# Patient Record
Sex: MALE | Race: WHITE | NOT HISPANIC OR LATINO | Employment: FULL TIME | URBAN - NONMETROPOLITAN AREA
[De-identification: names, ages, dates, MRNs, and addresses within clinical notes are randomized per-mention and may not be internally consistent; named-entity substitution may affect disease eponyms.]

---

## 2022-09-20 ENCOUNTER — OFFICE VISIT (OUTPATIENT)
Dept: URGENT CARE | Facility: CLINIC | Age: 42
End: 2022-09-20
Payer: COMMERCIAL

## 2022-09-20 ENCOUNTER — APPOINTMENT (OUTPATIENT)
Dept: RADIOLOGY | Facility: CLINIC | Age: 42
End: 2022-09-20
Payer: COMMERCIAL

## 2022-09-20 VITALS
HEART RATE: 89 BPM | DIASTOLIC BLOOD PRESSURE: 82 MMHG | OXYGEN SATURATION: 97 % | RESPIRATION RATE: 20 BRPM | TEMPERATURE: 97.2 F | WEIGHT: 169.6 LBS | SYSTOLIC BLOOD PRESSURE: 133 MMHG

## 2022-09-20 DIAGNOSIS — R05.9 COUGH: Primary | ICD-10-CM

## 2022-09-20 DIAGNOSIS — J06.9 VIRAL UPPER RESPIRATORY TRACT INFECTION: ICD-10-CM

## 2022-09-20 DIAGNOSIS — R05.9 COUGH: ICD-10-CM

## 2022-09-20 PROBLEM — J45.909: Status: ACTIVE | Noted: 2018-09-25

## 2022-09-20 PROCEDURE — 99203 OFFICE O/P NEW LOW 30 MIN: CPT | Performed by: PHYSICIAN ASSISTANT

## 2022-09-20 PROCEDURE — 71046 X-RAY EXAM CHEST 2 VIEWS: CPT

## 2022-09-20 RX ORDER — ALBUTEROL SULFATE 90 UG/1
2 AEROSOL, METERED RESPIRATORY (INHALATION) EVERY 6 HOURS PRN
COMMUNITY

## 2022-09-20 RX ORDER — PREDNISONE 10 MG/1
TABLET ORAL
Qty: 20 TABLET | Refills: 0 | Status: SHIPPED | OUTPATIENT
Start: 2022-09-20

## 2022-09-20 RX ORDER — MULTIVITAMIN
1 TABLET ORAL DAILY
COMMUNITY

## 2022-09-20 NOTE — PROGRESS NOTES
[facility]        NAME: Wilberto Thorpe is a 38 y o  male  : 1980    MRN: [id number]  DATE: 2022  TIME: 10:01 AM    Assessment and Plan   Cough [R05 9]  1  Cough  XR chest pa & lateral   2  Viral upper respiratory tract infection  predniSONE 10 mg tablet         Patient Instructions   Patient Instructions     Upper Respiratory Infection   WHAT YOU NEED TO KNOW:   An upper respiratory infection is also called a cold  It can affect your nose, throat, ears, and sinuses  Cold symptoms are usually worst for the first 3 to 5 days  Most people get better in 7 to 14 days  You may continue to cough for 2 to 3 weeks  Colds are caused by viruses and do not get better with antibiotics  DISCHARGE INSTRUCTIONS:   Call your local emergency number (911 in the [address] [place] [address],[address]) if:   · You have chest pain or trouble breathing  Return to the emergency department if:   · You have a fever over 102ºF (39ºC)  Call your doctor if:   · You have a low fever  · Your sore throat gets worse or you see white or yellow spots in your throat  · Your symptoms get worse after 3 to 5 days or are not better in 14 days  · You have a rash anywhere on your skin  · You have large, tender lumps in your neck  · You have thick, green, or yellow drainage from your nose  · You cough up thick yellow, green, or bloody mucus  · You have a bad earache  · You have questions or concerns about your condition or care  Medicines: You may need any of the following:  · Decongestants  help reduce nasal congestion and help you breathe more easily  If you take decongestant pills, they may make you feel restless or cause problems with your sleep  Do not use decongestant sprays for more than a few days  · Cough suppressants  help reduce coughing  Ask your healthcare provider which type of cough medicine is best for you  · NSAIDs , such as ibuprofen, help decrease swelling, pain, and fever   NSAIDs can cause stomach bleeding or kidney problems in certain people  If you take blood thinner medicine, always ask your healthcare provider if NSAIDs are safe for you  Always read the medicine label and follow directions  · Acetaminophen  decreases pain and fever  It is available without a doctor's order  Ask how much to take and how often to take it  Follow directions  Read the labels of all other medicines you are using to see if they also contain acetaminophen, or ask your doctor or pharmacist  Acetaminophen can cause liver damage if not taken correctly  Do not use more than 4 grams (4,000 milligrams) total of acetaminophen in one day  · Take your medicine as directed  Contact your healthcare provider if you think your medicine is not helping or if you have side effects  Tell him or her if you are allergic to any medicine  Keep a list of the medicines, vitamins, and herbs you take  Include the amounts, and when and why you take them  Bring the list or the pill bottles to follow-up visits  Carry your medicine list with you in case of an emergency  Self-care:   · Rest as much as possible  Slowly start to do more each day  · Drink more liquids as directed  Liquids will help thin and loosen mucus so you can cough it up  Liquids will also help prevent dehydration  Liquids that help prevent dehydration include water, fruit juice, and broth  Do not drink liquids that contain caffeine  Caffeine can increase your risk for dehydration  Ask your healthcare provider how much liquid to drink each day  · Soothe a sore throat  Gargle with warm salt water  Make salt water by dissolving ¼ teaspoon salt in 1 cup warm water  You may also suck on hard candy or throat lozenges  You may use a sore throat spray  · Use a humidifier or vaporizer  Use a cool mist humidifier or a vaporizer to increase air moisture in your home  This may make it easier for you to breathe and help decrease your cough      · Use saline nasal drops as directed  These help relieve congestion  · Apply petroleum-based jelly around the outside of your nostrils  This can decrease irritation from blowing your nose  · Do not smoke  Nicotine and other chemicals in cigarettes and cigars can make your symptoms worse  They can also cause infections such as bronchitis or pneumonia  Ask your healthcare provider for information if you currently smoke and need help to quit  E-cigarettes or smokeless tobacco still contain nicotine  Talk to your healthcare provider before you use these products  Prevent a cold:   · Wash your hands often  Use soap and water every time you wash your hands  Rub your soapy hands together, lacing your fingers  Use the fingers of one hand to scrub under the nails of the other hand  Wash for at least 20 seconds  Rinse with warm, running water for several seconds  Then dry your hands  Use germ-killing gel if soap and water are not available  Do not touch your eyes or mouth without washing your hands first          · Cover a sneeze or cough  Use a tissue that covers your mouth and nose  Put the used tissue in the trash right away  Use the bend of your arm if a tissue is not available  Wash your hands well with soap and water or use a hand   Do not stand close to anyone who is sneezing or coughing  · Try to stay away from others while you are sick  This is especially important during the first 2 to 3 days when the virus is more easily spread  Wait until a fever, cough, or other symptoms are gone before you return to work or other regular activities  · Do not share items while you are sick  This includes food, drinks, eating utensils, and dishes  Follow up with your doctor as directed:  Write down your questions so you remember to ask them during your visits  © Copyright [employer] 2022 Information is for End User's use only and may not be sold, redistributed or otherwise used for commercial purposes   All illustrations and images included in CareNotes® are the copyrighted property of [employer] M , Inc  or [facility]  The above information is an  only  It is not intended as medical advice for individual conditions or treatments  Talk to your doctor, nurse or pharmacist before following any medical regimen to see if it is safe and effective for you  Follow up with PCP in 3-5 days  Proceed to  ER if symptoms worsen  Chief Complaint     Chief Complaint   Patient presents with    Cold Like Symptoms     Cough, sore throat and congestion since Thursday  History of Present Illness       The patient presents to the clinic for a cough, sore throat, congestion, x4 days  The patient was diagnosed with COVID echo ice anger on July 12th  He was not treated for his symptoms  He does have a history of asthma for which he takes albuterol  The patient states that he does not feel that his cough has resolved since he was diagnosed with COVID  He continues to have a nonproductive cough as well as some slight shortness of breath  He currently is only on albuterol for his asthma but states that he has not needed the albuterol more often than usual   He also had a sore throat earlier in the week which has since resolved  He currently denies associated fevers, chills, nausea or vomiting  The patient had a COVID booster last year but is not currently up-to-date on his COVID vaccines  Review of Systems   Review of Systems   Constitutional: Negative for chills and fever  HENT: Positive for rhinorrhea and sore throat  Negative for congestion, ear pain, postnasal drip, sinus pressure and sinus pain  Eyes: Negative for pain and visual disturbance  Respiratory: Positive for cough, shortness of breath and wheezing  Cardiovascular: Negative for chest pain and palpitations  Gastrointestinal: Negative for abdominal pain, nausea and vomiting     Genitourinary: Negative for dysuria and hematuria  Musculoskeletal: Negative for arthralgias and back pain  Skin: Negative for color change and rash  Neurological: Positive for headaches  Negative for dizziness, seizures and syncope  All other systems reviewed and are negative  Current Medications       Current Outpatient Medications:     albuterol (PROVENTIL HFA,VENTOLIN HFA) 90 mcg/act inhaler, Inhale 2 puffs every 6 (six) hours as needed for wheezing, Disp: , Rfl:     Multiple Vitamin (multivitamin) tablet, Take 1 tablet by mouth daily, Disp: , Rfl:     predniSONE 10 mg tablet, 4 po for 2 days, then 3x2, 2x2, 1x2, Disp: 20 tablet, Rfl: 0    Current Allergies     Allergies as of 09/20/2022 - Reviewed 09/20/2022   Allergen Reaction Noted    Sulfa antibiotics Hives 09/20/2022            The following portions of the patient's history were reviewed and updated as appropriate: allergies, current medications, past family history, past medical history, past social history, past surgical history and problem list      Past Medical History:   Diagnosis Date    Asthma        History reviewed  No pertinent surgical history  History reviewed  No pertinent family history  Medications have been verified  Objective   /82   Pulse 89   Temp (!) 97 2 °F (36 2 °C)   Resp 20   Wt 76 9 kg (169 lb 9 6 oz)   SpO2 97%        Physical Exam     Physical Exam  Constitutional:       Appearance: He is well-developed  HENT:      Head: Normocephalic  Nose: Congestion and rhinorrhea present  Eyes:      General:         Left eye: No discharge  Pupils: Pupils are equal, round, and reactive to light  Neck:      Thyroid: No thyromegaly  Trachea: No tracheal deviation  Cardiovascular:      Rate and Rhythm: Normal rate and regular rhythm  Heart sounds: No murmur heard  Pulmonary:      Effort: Pulmonary effort is normal  No respiratory distress  Breath sounds: Rhonchi present  No wheezing or rales        Comments: -rhonchi noted left lower lung base  Chest:      Chest wall: No tenderness  Abdominal:      General: Bowel sounds are normal  There is no distension  Palpations: Abdomen is soft  There is no mass  Tenderness: There is no abdominal tenderness  There is no guarding or rebound  Musculoskeletal:         General: Normal range of motion  Cervical back: Normal range of motion  Skin:     General: Skin is warm  Neurological:      Mental Status: He is alert  EXAM PERFORMED/VIEWS:  XR CHEST PA & LATERAL  DUAL ENERGY SUBTRACTION        FINDINGS:     Cardiomediastinal silhouette appears unremarkable      The lungs are clear  No pneumothorax or pleural effusion      Osseous structures appear within normal limits for patient age      IMPRESSION:     No acute cardiopulmonary disease            -I reviewed the chest x-ray with the patient  His symptoms are likely viral given his negative chest x-ray and recent COVID infection  I will add prednisone to help with his breathing  The patient was instructed to continue to use albuterol as needed and he will need to follow up with his PCP if his symptoms fail to improve  Patient was instructed to go the ER if symptoms worsen

## 2022-09-20 NOTE — LETTER
September 20, 2022     Patient: Shante Aguilar   YOB: 1980   Date of Visit: 9/20/2022       To Whom it May Concern:    Shante Aguilar was seen in my clinic on 9/20/2022  He may return to work on 09/21/2022  He was off work 09/19-9/20    If you have any questions or concerns, please don't hesitate to call           Sincerely,          Josemanuel Goel PA-C        CC: No Recipients

## 2022-09-20 NOTE — PATIENT INSTRUCTIONS
Upper Respiratory Infection   WHAT YOU NEED TO KNOW:   An upper respiratory infection is also called a cold  It can affect your nose, throat, ears, and sinuses  Cold symptoms are usually worst for the first 3 to 5 days  Most people get better in 7 to 14 days  You may continue to cough for 2 to 3 weeks  Colds are caused by viruses and do not get better with antibiotics  DISCHARGE INSTRUCTIONS:   Call your local emergency number (911 in the [address] [place] [address],[address]) if:   You have chest pain or trouble breathing  Return to the emergency department if:   You have a fever over 102ºF (39ºC)  Call your doctor if:   You have a low fever  Your sore throat gets worse or you see white or yellow spots in your throat  Your symptoms get worse after 3 to 5 days or are not better in 14 days  You have a rash anywhere on your skin  You have large, tender lumps in your neck  You have thick, green, or yellow drainage from your nose  You cough up thick yellow, green, or bloody mucus  You have a bad earache  You have questions or concerns about your condition or care  Medicines: You may need any of the following:  Decongestants  help reduce nasal congestion and help you breathe more easily  If you take decongestant pills, they may make you feel restless or cause problems with your sleep  Do not use decongestant sprays for more than a few days  Cough suppressants  help reduce coughing  Ask your healthcare provider which type of cough medicine is best for you  NSAIDs , such as ibuprofen, help decrease swelling, pain, and fever  NSAIDs can cause stomach bleeding or kidney problems in certain people  If you take blood thinner medicine, always ask your healthcare provider if NSAIDs are safe for you  Always read the medicine label and follow directions  Acetaminophen  decreases pain and fever  It is available without a doctor's order  Ask how much to take and how often to take it  Follow directions   Read the labels of all other medicines you are using to see if they also contain acetaminophen, or ask your doctor or pharmacist  Acetaminophen can cause liver damage if not taken correctly  Do not use more than 4 grams (4,000 milligrams) total of acetaminophen in one day  Take your medicine as directed  Contact your healthcare provider if you think your medicine is not helping or if you have side effects  Tell him or her if you are allergic to any medicine  Keep a list of the medicines, vitamins, and herbs you take  Include the amounts, and when and why you take them  Bring the list or the pill bottles to follow-up visits  Carry your medicine list with you in case of an emergency  Self-care:   Rest as much as possible  Slowly start to do more each day  Drink more liquids as directed  Liquids will help thin and loosen mucus so you can cough it up  Liquids will also help prevent dehydration  Liquids that help prevent dehydration include water, fruit juice, and broth  Do not drink liquids that contain caffeine  Caffeine can increase your risk for dehydration  Ask your healthcare provider how much liquid to drink each day  Soothe a sore throat  Gargle with warm salt water  Make salt water by dissolving ¼ teaspoon salt in 1 cup warm water  You may also suck on hard candy or throat lozenges  You may use a sore throat spray  Use a humidifier or vaporizer  Use a cool mist humidifier or a vaporizer to increase air moisture in your home  This may make it easier for you to breathe and help decrease your cough  Use saline nasal drops as directed  These help relieve congestion  Apply petroleum-based jelly around the outside of your nostrils  This can decrease irritation from blowing your nose  Do not smoke  Nicotine and other chemicals in cigarettes and cigars can make your symptoms worse  They can also cause infections such as bronchitis or pneumonia   Ask your healthcare provider for information if you currently smoke and need help to quit  E-cigarettes or smokeless tobacco still contain nicotine  Talk to your healthcare provider before you use these products  Prevent a cold: Wash your hands often  Use soap and water every time you wash your hands  Rub your soapy hands together, lacing your fingers  Use the fingers of one hand to scrub under the nails of the other hand  Wash for at least 20 seconds  Rinse with warm, running water for several seconds  Then dry your hands  Use germ-killing gel if soap and water are not available  Do not touch your eyes or mouth without washing your hands first          Cover a sneeze or cough  Use a tissue that covers your mouth and nose  Put the used tissue in the trash right away  Use the bend of your arm if a tissue is not available  Wash your hands well with soap and water or use a hand   Do not stand close to anyone who is sneezing or coughing  Try to stay away from others while you are sick  This is especially important during the first 2 to 3 days when the virus is more easily spread  Wait until a fever, cough, or other symptoms are gone before you return to work or other regular activities  Do not share items while you are sick  This includes food, drinks, eating utensils, and dishes  Follow up with your doctor as directed:  Write down your questions so you remember to ask them during your visits  © Copyright [employer] 2022 Information is for End User's use only and may not be sold, redistributed or otherwise used for commercial purposes  All illustrations and images included in CareNotes® are the copyrighted property of [employer]  or [facility] [employer]   The above information is an  only  It is not intended as medical advice for individual conditions or treatments  Talk to your doctor, nurse or pharmacist before following any medical regimen to see if it is safe and effective for you

## 2022-11-08 ENCOUNTER — OFFICE VISIT (OUTPATIENT)
Dept: URGENT CARE | Facility: CLINIC | Age: 42
End: 2022-11-08

## 2022-11-08 VITALS
OXYGEN SATURATION: 98 % | DIASTOLIC BLOOD PRESSURE: 84 MMHG | SYSTOLIC BLOOD PRESSURE: 162 MMHG | WEIGHT: 176.2 LBS | TEMPERATURE: 97.8 F | RESPIRATION RATE: 20 BRPM | HEART RATE: 74 BPM

## 2022-11-08 DIAGNOSIS — R05.1 ACUTE COUGH: ICD-10-CM

## 2022-11-08 DIAGNOSIS — J39.9 UPPER RESPIRATORY DISEASE: Primary | ICD-10-CM

## 2022-11-08 LAB
SARS-COV-2 AG UPPER RESP QL IA: NEGATIVE
VALID CONTROL: NORMAL

## 2022-11-08 RX ORDER — AMOXICILLIN AND CLAVULANATE POTASSIUM 875; 125 MG/1; MG/1
1 TABLET, FILM COATED ORAL EVERY 12 HOURS SCHEDULED
Qty: 14 TABLET | Refills: 0 | Status: SHIPPED | OUTPATIENT
Start: 2022-11-08 | End: 2022-11-15

## 2022-11-08 NOTE — PROGRESS NOTES
[facility]        NAME: Sebas Graves is a 38 y o  male  : 1980    MRN: [id number]  DATE: 2022  TIME: 10:25 AM    Assessment and Plan   Upper respiratory disease [J39 9]  1  Upper respiratory disease  amoxicillin-clavulanate (AUGMENTIN) 875-125 mg per tablet         Patient Instructions   Patient Instructions     Upper Respiratory Infection   WHAT YOU NEED TO KNOW:   An upper respiratory infection is also called a cold  It can affect your nose, throat, ears, and sinuses  Cold symptoms are usually worst for the first 3 to 5 days  Most people get better in 7 to 14 days  You may continue to cough for 2 to 3 weeks  Colds are caused by viruses and do not get better with antibiotics  DISCHARGE INSTRUCTIONS:   Call your local emergency number (911 in the [address] [place] [address],[address]) if:   · You have chest pain or trouble breathing  Return to the emergency department if:   · You have a fever over 102ºF (39ºC)  Call your doctor if:   · You have a low fever  · Your sore throat gets worse or you see white or yellow spots in your throat  · Your symptoms get worse after 3 to 5 days or are not better in 14 days  · You have a rash anywhere on your skin  · You have large, tender lumps in your neck  · You have thick, green, or yellow drainage from your nose  · You cough up thick yellow, green, or bloody mucus  · You have a bad earache  · You have questions or concerns about your condition or care  Medicines: You may need any of the following:  · Decongestants  help reduce nasal congestion and help you breathe more easily  If you take decongestant pills, they may make you feel restless or cause problems with your sleep  Do not use decongestant sprays for more than a few days  · Cough suppressants  help reduce coughing  Ask your healthcare provider which type of cough medicine is best for you  · NSAIDs , such as ibuprofen, help decrease swelling, pain, and fever   NSAIDs can cause stomach bleeding or kidney problems in certain people  If you take blood thinner medicine, always ask your healthcare provider if NSAIDs are safe for you  Always read the medicine label and follow directions  · Acetaminophen  decreases pain and fever  It is available without a doctor's order  Ask how much to take and how often to take it  Follow directions  Read the labels of all other medicines you are using to see if they also contain acetaminophen, or ask your doctor or pharmacist  Acetaminophen can cause liver damage if not taken correctly  Do not use more than 4 grams (4,000 milligrams) total of acetaminophen in one day  · Take your medicine as directed  Contact your healthcare provider if you think your medicine is not helping or if you have side effects  Tell him or her if you are allergic to any medicine  Keep a list of the medicines, vitamins, and herbs you take  Include the amounts, and when and why you take them  Bring the list or the pill bottles to follow-up visits  Carry your medicine list with you in case of an emergency  Self-care:   · Rest as much as possible  Slowly start to do more each day  · Drink more liquids as directed  Liquids will help thin and loosen mucus so you can cough it up  Liquids will also help prevent dehydration  Liquids that help prevent dehydration include water, fruit juice, and broth  Do not drink liquids that contain caffeine  Caffeine can increase your risk for dehydration  Ask your healthcare provider how much liquid to drink each day  · Soothe a sore throat  Gargle with warm salt water  Make salt water by dissolving ¼ teaspoon salt in 1 cup warm water  You may also suck on hard candy or throat lozenges  You may use a sore throat spray  · Use a humidifier or vaporizer  Use a cool mist humidifier or a vaporizer to increase air moisture in your home  This may make it easier for you to breathe and help decrease your cough      · Use saline nasal drops as directed  These help relieve congestion  · Apply petroleum-based jelly around the outside of your nostrils  This can decrease irritation from blowing your nose  · Do not smoke  Nicotine and other chemicals in cigarettes and cigars can make your symptoms worse  They can also cause infections such as bronchitis or pneumonia  Ask your healthcare provider for information if you currently smoke and need help to quit  E-cigarettes or smokeless tobacco still contain nicotine  Talk to your healthcare provider before you use these products  Prevent a cold:   · Wash your hands often  Use soap and water every time you wash your hands  Rub your soapy hands together, lacing your fingers  Use the fingers of one hand to scrub under the nails of the other hand  Wash for at least 20 seconds  Rinse with warm, running water for several seconds  Then dry your hands  Use germ-killing gel if soap and water are not available  Do not touch your eyes or mouth without washing your hands first          · Cover a sneeze or cough  Use a tissue that covers your mouth and nose  Put the used tissue in the trash right away  Use the bend of your arm if a tissue is not available  Wash your hands well with soap and water or use a hand   Do not stand close to anyone who is sneezing or coughing  · Try to stay away from others while you are sick  This is especially important during the first 2 to 3 days when the virus is more easily spread  Wait until a fever, cough, or other symptoms are gone before you return to work or other regular activities  · Do not share items while you are sick  This includes food, drinks, eating utensils, and dishes  Follow up with your doctor as directed:  Write down your questions so you remember to ask them during your visits  © Copyright [employer] 2022 Information is for End User's use only and may not be sold, redistributed or otherwise used for commercial purposes   All illustrations and images included in CareNotes® are the copyrighted property of [employer] D A M [employer] Inc  or [facility] [employer]   The above information is an  only  It is not intended as medical advice for individual conditions or treatments  Talk to your doctor, nurse or pharmacist before following any medical regimen to see if it is safe and effective for you  Follow up with PCP in 3-5 days  Proceed to  ER if symptoms worsen  Chief Complaint     Chief Complaint   Patient presents with   • Cold Like Symptoms     Started Friday with sore throat  Started Sunday with congestion and productive cough for yellow brown phlegm  Using Dayquil  History of Present Illness       The patient is a 45-year-old male with a past medical history significant for asthma who presents to the clinic complaining of cough, wheezing, and congestion for 4 days  He was started on [facility] by his pulmonologist proximally 1 month ago  He states that he has needed his albuterol inhaler over the last few days due to increased shortness of breath  He is not up-to-date on his COVID vaccine but did get a flu shot and a pneumonia shot about 1 month ago  His COVID test was negative today  Review of Systems   Review of Systems   Constitutional: Negative for chills and fever  HENT: Positive for congestion and sore throat  Negative for ear pain, mouth sores, postnasal drip and sinus pain  Eyes: Negative for pain and visual disturbance  Respiratory: Positive for cough, shortness of breath and wheezing  Negative for stridor  Cardiovascular: Negative for chest pain and palpitations  Gastrointestinal: Negative for abdominal pain, diarrhea and vomiting  Genitourinary: Negative for dysuria and hematuria  Musculoskeletal: Negative for arthralgias and back pain  Skin: Negative for color change and rash  Neurological: Negative for dizziness, seizures, syncope and headaches     All other systems reviewed and are negative  Current Medications       Current Outpatient Medications:   •  albuterol (PROVENTIL HFA,VENTOLIN HFA) 90 mcg/act inhaler, Inhale 2 puffs every 6 (six) hours as needed for wheezing, Disp: , Rfl:   •  amoxicillin-clavulanate (AUGMENTIN) 875-125 mg per tablet, Take 1 tablet by mouth every 12 (twelve) hours for 7 days, Disp: 14 tablet, Rfl: 0  •  mometasone-formoterol (Dulera) 100-5 MCG/ACT inhaler, Inhale 2 puffs 2 (two) times a day Rinse mouth after use , Disp: , Rfl:   •  Multiple Vitamin (multivitamin) tablet, Take 1 tablet by mouth daily, Disp: , Rfl:     Current Allergies     Allergies as of 11/08/2022 - Reviewed 11/08/2022   Allergen Reaction Noted   • Sulfa antibiotics Hives 09/20/2022            The following portions of the patient's history were reviewed and updated as appropriate: allergies, current medications, past family history, past medical history, past social history, past surgical history and problem list      Past Medical History:   Diagnosis Date   • Asthma        History reviewed  No pertinent surgical history  History reviewed  No pertinent family history  Medications have been verified  Objective   /84   Pulse 74   Temp 97 8 °F (36 6 °C)   Resp 20   Wt 79 9 kg (176 lb 3 2 oz)   SpO2 98%        Physical Exam     Physical Exam  Constitutional:       Appearance: He is well-developed  HENT:      Head: Normocephalic  Right Ear: Tympanic membrane normal       Left Ear: Tympanic membrane normal       Nose: Congestion and rhinorrhea present  Comments: There is green discharge noted in the nares with sinus pressure  Eyes:      General:         Left eye: No discharge  Pupils: Pupils are equal, round, and reactive to light  Neck:      Thyroid: No thyromegaly  Trachea: No tracheal deviation  Cardiovascular:      Rate and Rhythm: Normal rate and regular rhythm  Heart sounds: No murmur heard    Pulmonary:      Effort: Pulmonary effort is normal  No respiratory distress  Breath sounds: Rhonchi present  No wheezing or rales  Chest:      Chest wall: No tenderness  Abdominal:      General: Bowel sounds are normal  There is no distension  Palpations: Abdomen is soft  There is no mass  Tenderness: There is no abdominal tenderness  There is no guarding or rebound  Musculoskeletal:         General: Normal range of motion  Cervical back: Normal range of motion  Skin:     General: Skin is warm  Neurological:      Mental Status: He is alert              -the patient should follow-up with his PCP or go the ER if symptoms worsen

## 2022-11-08 NOTE — PATIENT INSTRUCTIONS
Upper Respiratory Infection   WHAT YOU NEED TO KNOW:   An upper respiratory infection is also called a cold  It can affect your nose, throat, ears, and sinuses  Cold symptoms are usually worst for the first 3 to 5 days  Most people get better in 7 to 14 days  You may continue to cough for 2 to 3 weeks  Colds are caused by viruses and do not get better with antibiotics  DISCHARGE INSTRUCTIONS:   Call your local emergency number (911 in the [address] [place] [address],[address]) if:   You have chest pain or trouble breathing  Return to the emergency department if:   You have a fever over 102ºF (39ºC)  Call your doctor if:   You have a low fever  Your sore throat gets worse or you see white or yellow spots in your throat  Your symptoms get worse after 3 to 5 days or are not better in 14 days  You have a rash anywhere on your skin  You have large, tender lumps in your neck  You have thick, green, or yellow drainage from your nose  You cough up thick yellow, green, or bloody mucus  You have a bad earache  You have questions or concerns about your condition or care  Medicines: You may need any of the following:  Decongestants  help reduce nasal congestion and help you breathe more easily  If you take decongestant pills, they may make you feel restless or cause problems with your sleep  Do not use decongestant sprays for more than a few days  Cough suppressants  help reduce coughing  Ask your healthcare provider which type of cough medicine is best for you  NSAIDs , such as ibuprofen, help decrease swelling, pain, and fever  NSAIDs can cause stomach bleeding or kidney problems in certain people  If you take blood thinner medicine, always ask your healthcare provider if NSAIDs are safe for you  Always read the medicine label and follow directions  Acetaminophen  decreases pain and fever  It is available without a doctor's order  Ask how much to take and how often to take it  Follow directions   Read the labels of all other medicines you are using to see if they also contain acetaminophen, or ask your doctor or pharmacist  Acetaminophen can cause liver damage if not taken correctly  Do not use more than 4 grams (4,000 milligrams) total of acetaminophen in one day  Take your medicine as directed  Contact your healthcare provider if you think your medicine is not helping or if you have side effects  Tell him or her if you are allergic to any medicine  Keep a list of the medicines, vitamins, and herbs you take  Include the amounts, and when and why you take them  Bring the list or the pill bottles to follow-up visits  Carry your medicine list with you in case of an emergency  Self-care:   Rest as much as possible  Slowly start to do more each day  Drink more liquids as directed  Liquids will help thin and loosen mucus so you can cough it up  Liquids will also help prevent dehydration  Liquids that help prevent dehydration include water, fruit juice, and broth  Do not drink liquids that contain caffeine  Caffeine can increase your risk for dehydration  Ask your healthcare provider how much liquid to drink each day  Soothe a sore throat  Gargle with warm salt water  Make salt water by dissolving ¼ teaspoon salt in 1 cup warm water  You may also suck on hard candy or throat lozenges  You may use a sore throat spray  Use a humidifier or vaporizer  Use a cool mist humidifier or a vaporizer to increase air moisture in your home  This may make it easier for you to breathe and help decrease your cough  Use saline nasal drops as directed  These help relieve congestion  Apply petroleum-based jelly around the outside of your nostrils  This can decrease irritation from blowing your nose  Do not smoke  Nicotine and other chemicals in cigarettes and cigars can make your symptoms worse  They can also cause infections such as bronchitis or pneumonia   Ask your healthcare provider for information if you currently smoke and need help to quit  E-cigarettes or smokeless tobacco still contain nicotine  Talk to your healthcare provider before you use these products  Prevent a cold: Wash your hands often  Use soap and water every time you wash your hands  Rub your soapy hands together, lacing your fingers  Use the fingers of one hand to scrub under the nails of the other hand  Wash for at least 20 seconds  Rinse with warm, running water for several seconds  Then dry your hands  Use germ-killing gel if soap and water are not available  Do not touch your eyes or mouth without washing your hands first          Cover a sneeze or cough  Use a tissue that covers your mouth and nose  Put the used tissue in the trash right away  Use the bend of your arm if a tissue is not available  Wash your hands well with soap and water or use a hand   Do not stand close to anyone who is sneezing or coughing  Try to stay away from others while you are sick  This is especially important during the first 2 to 3 days when the virus is more easily spread  Wait until a fever, cough, or other symptoms are gone before you return to work or other regular activities  Do not share items while you are sick  This includes food, drinks, eating utensils, and dishes  Follow up with your doctor as directed:  Write down your questions so you remember to ask them during your visits  © Copyright "[employer] 2022 Information is for End User's use only and may not be sold, redistributed or otherwise used for commercial purposes  All illustrations and images included in CareNotes® are the copyrighted property of A [employer] M , Inc  or [facility] [employer]   The above information is an  only  It is not intended as medical advice for individual conditions or treatments  Talk to your doctor, nurse or pharmacist before following any medical regimen to see if it is safe and effective for you

## 2022-11-29 ENCOUNTER — OFFICE VISIT (OUTPATIENT)
Dept: URGENT CARE | Facility: CLINIC | Age: 42
End: 2022-11-29

## 2022-11-29 VITALS
WEIGHT: 174 LBS | SYSTOLIC BLOOD PRESSURE: 132 MMHG | OXYGEN SATURATION: 98 % | BODY MASS INDEX: 27.31 KG/M2 | HEIGHT: 67 IN | DIASTOLIC BLOOD PRESSURE: 71 MMHG | HEART RATE: 92 BPM | RESPIRATION RATE: 20 BRPM | TEMPERATURE: 98.1 F

## 2022-11-29 DIAGNOSIS — J02.9 ACUTE PHARYNGITIS, UNSPECIFIED ETIOLOGY: Primary | ICD-10-CM

## 2022-11-29 LAB — S PYO AG THROAT QL: NEGATIVE

## 2022-11-29 NOTE — PROGRESS NOTES
[facility]        NAME: Sunshine Mclean is a 38 y o  male  : 1980    MRN: [id number]  DATE: 2022  TIME: 5:56 PM    Assessment and Plan   Acute pharyngitis, unspecified etiology [J02 9]  1  Acute pharyngitis, unspecified etiology  POCT rapid strepA    Throat culture        Rapid strep was negative will send throat culture  Recommend supportive care in humidifier    Patient Instructions     Rapid strep was negative  Will send out a throat culture  Symptoms are consistent with viral illness  Follow up with PCP in 3-5 days  Proceed to  ER if symptoms worsen  Chief Complaint     Chief Complaint   Patient presents with   • Sore Throat         History of Present Illness       Patient is a 38year old male who presents to the office for a sore throat that he had for 2 weeks, went away for a few days and noticed that today it returned after awakening from sleep from work  Was coughing today  Review of Systems   Review of Systems   Constitutional: Negative for chills and fever  HENT: Positive for sore throat  Respiratory: Negative for cough, shortness of breath and wheezing  Cardiovascular: Negative for chest pain and palpitations  Gastrointestinal: Negative for diarrhea, nausea and vomiting  All other systems reviewed and are negative          Current Medications       Current Outpatient Medications:   •  albuterol (PROVENTIL HFA,VENTOLIN HFA) 90 mcg/act inhaler, Inhale 2 puffs every 6 (six) hours as needed for wheezing, Disp: , Rfl:   •  mometasone-formoterol (Dulera) 100-5 MCG/ACT inhaler, Inhale 2 puffs 2 (two) times a day Rinse mouth after use , Disp: , Rfl:   •  Multiple Vitamin (multivitamin) tablet, Take 1 tablet by mouth daily, Disp: , Rfl:     Current Allergies     Allergies as of 2022 - Reviewed 2022   Allergen Reaction Noted   • Sulfa antibiotics Hives 2022            The following portions of the patient's history were reviewed and updated as appropriate: allergies, current medications, past family history, past medical history, past social history, past surgical history and problem list      Past Medical History:   Diagnosis Date   • Asthma        History reviewed  No pertinent surgical history  History reviewed  No pertinent family history  Medications have been verified  Objective   /71   Pulse 92   Temp 98 1 °F (36 7 °C)   Resp 20   Ht 5' 7" (1 702 m)   Wt 78 9 kg (174 lb)   SpO2 98%   BMI 27 25 kg/m²        Physical Exam     Physical Exam  Vitals and nursing note reviewed  Constitutional:       Appearance: He is well-developed and normal weight  HENT:      Right Ear: Tympanic membrane normal       Left Ear: Tympanic membrane normal       Mouth/Throat:      Mouth: Mucous membranes are moist       Pharynx: Posterior oropharyngeal erythema present  Tonsils: Tonsillar exudate present  0 on the right  3+ on the left  Cardiovascular:      Rate and Rhythm: Normal rate and regular rhythm  Heart sounds: Normal heart sounds  No murmur heard  No friction rub  No gallop  Pulmonary:      Effort: Pulmonary effort is normal       Breath sounds: Normal breath sounds  Lymphadenopathy:      Cervical: Cervical adenopathy present  Skin:     General: Skin is warm  Capillary Refill: Capillary refill takes less than 2 seconds  Neurological:      Mental Status: He is alert

## 2022-11-29 NOTE — PATIENT INSTRUCTIONS
Rapid strep was negative  Will send out a throat culture  Symptoms are consistent with viral illness

## 2022-12-02 ENCOUNTER — TELEPHONE (OUTPATIENT)
Dept: URGENT CARE | Facility: CLINIC | Age: 42
End: 2022-12-02

## 2022-12-02 LAB — BACTERIA THROAT CULT: ABNORMAL

## 2022-12-02 NOTE — TELEPHONE ENCOUNTER
Spoke with patient in regards to positive throat culture  Patient states his throat is starting to feel little bit better however he did go to the [facility] in [place] [address] and was recently diagnosed with the flu and is feeling under the weather from that  Discussed with patient that since the start is feeling better we can hold off on antibiotics at this time is most likely the pain is from the fluid he should feel better in a few days  He continues with sore throat after his other symptoms have resolved then we will discuss antibiotic treatment

## 2023-04-18 ENCOUNTER — HOSPITAL ENCOUNTER (EMERGENCY)
Facility: HOSPITAL | Age: 43
Discharge: HOME | End: 2023-04-18
Attending: EMERGENCY MEDICINE | Admitting: EMERGENCY MEDICINE

## 2023-04-18 ENCOUNTER — APPOINTMENT (EMERGENCY)
Dept: CT IMAGING | Facility: HOSPITAL | Age: 43
End: 2023-04-18

## 2023-04-18 VITALS
SYSTOLIC BLOOD PRESSURE: 134 MMHG | BODY MASS INDEX: 28.72 KG/M2 | RESPIRATION RATE: 16 BRPM | DIASTOLIC BLOOD PRESSURE: 88 MMHG | TEMPERATURE: 98 F | WEIGHT: 182.98 LBS | HEART RATE: 76 BPM | OXYGEN SATURATION: 98 % | HEIGHT: 67 IN

## 2023-04-18 DIAGNOSIS — N20.0 KIDNEY STONE: Primary | ICD-10-CM

## 2023-04-18 LAB
ALBUMIN SERPL BCP-MCNC: 4.4 G/DL (ref 3.5–5)
ALP SERPL-CCNC: 60 U/L (ref 34–104)
ALT SERPL W P-5'-P-CCNC: 46 U/L (ref 7–52)
ANION GAP SERPL CALCULATED.3IONS-SCNC: 5 MMOL/L (ref 4–13)
AST SERPL W P-5'-P-CCNC: 24 U/L (ref 13–39)
BASOPHILS # BLD AUTO: 0.06 THOUSANDS/ΜL (ref 0–0.1)
BASOPHILS NFR BLD AUTO: 1 % (ref 0–1)
BILIRUB SERPL-MCNC: 0.37 MG/DL (ref 0.2–1)
BILIRUB UR QL STRIP: NEGATIVE
BUN SERPL-MCNC: 17 MG/DL (ref 5–25)
CALCIUM SERPL-MCNC: 9.1 MG/DL (ref 8.4–10.2)
CHLORIDE SERPL-SCNC: 106 MMOL/L (ref 96–108)
CLARITY UR: CLEAR
CO2 SERPL-SCNC: 28 MMOL/L (ref 21–32)
COLOR UR: YELLOW
CREAT SERPL-MCNC: 1.34 MG/DL (ref 0.6–1.3)
EOSINOPHIL # BLD AUTO: 0.15 THOUSAND/ΜL (ref 0–0.61)
EOSINOPHIL NFR BLD AUTO: 2 % (ref 0–6)
ERYTHROCYTE [DISTWIDTH] IN BLOOD BY AUTOMATED COUNT: 12.9 % (ref 11.6–15.1)
GFR SERPL CREATININE-BSD FRML MDRD: 65 ML/MIN/1.73SQ M
GLUCOSE SERPL-MCNC: 103 MG/DL (ref 65–140)
GLUCOSE UR STRIP-MCNC: NEGATIVE MG/DL
HCT VFR BLD AUTO: 37.7 % (ref 36.5–49.3)
HGB BLD-MCNC: 12.3 G/DL (ref 12–17)
HGB UR QL STRIP.AUTO: NEGATIVE
IMM GRANULOCYTES # BLD AUTO: 0.04 THOUSAND/UL (ref 0–0.2)
IMM GRANULOCYTES NFR BLD AUTO: 1 % (ref 0–2)
KETONES UR STRIP-MCNC: NEGATIVE MG/DL
LEUKOCYTE ESTERASE UR QL STRIP: NEGATIVE
LIPASE SERPL-CCNC: 46 U/L (ref 11–82)
LYMPHOCYTES # BLD AUTO: 2.44 THOUSANDS/ΜL (ref 0.6–4.47)
LYMPHOCYTES NFR BLD AUTO: 30 % (ref 14–44)
MCH RBC QN AUTO: 28.8 PG (ref 26.8–34.3)
MCHC RBC AUTO-ENTMCNC: 32.6 G/DL (ref 31.4–37.4)
MCV RBC AUTO: 88 FL (ref 82–98)
MONOCYTES # BLD AUTO: 0.98 THOUSAND/ΜL (ref 0.17–1.22)
MONOCYTES NFR BLD AUTO: 12 % (ref 4–12)
NEUTROPHILS # BLD AUTO: 4.46 THOUSANDS/ΜL (ref 1.85–7.62)
NEUTS SEG NFR BLD AUTO: 54 % (ref 43–75)
NITRITE UR QL STRIP: NEGATIVE
NRBC BLD AUTO-RTO: 0 /100 WBCS
PH UR STRIP.AUTO: 8 [PH]
PLATELET # BLD AUTO: 293 THOUSANDS/UL (ref 149–390)
PMV BLD AUTO: 9.6 FL (ref 8.9–12.7)
POTASSIUM SERPL-SCNC: 3.9 MMOL/L (ref 3.5–5.3)
PROT SERPL-MCNC: 7.1 G/DL (ref 6.4–8.4)
PROT UR STRIP-MCNC: NEGATIVE MG/DL
RBC # BLD AUTO: 4.27 MILLION/UL (ref 3.88–5.62)
SODIUM SERPL-SCNC: 139 MMOL/L (ref 135–147)
SP GR UR STRIP.AUTO: 1.01 (ref 1–1.03)
UROBILINOGEN UR QL STRIP.AUTO: 0.2 E.U./DL
WBC # BLD AUTO: 8.13 THOUSAND/UL (ref 4.31–10.16)

## 2023-04-18 RX ORDER — TAMSULOSIN HYDROCHLORIDE 0.4 MG/1
0.4 CAPSULE ORAL ONCE
Status: COMPLETED | OUTPATIENT
Start: 2023-04-18 | End: 2023-04-18

## 2023-04-18 RX ORDER — TAMSULOSIN HYDROCHLORIDE 0.4 MG/1
0.4 CAPSULE ORAL
Qty: 9 CAPSULE | Refills: 0 | Status: SHIPPED | OUTPATIENT
Start: 2023-04-18

## 2023-04-18 RX ORDER — FERROUS SULFATE 325(65) MG
325 TABLET ORAL AS NEEDED
COMMUNITY

## 2023-04-18 RX ORDER — OXYCODONE HYDROCHLORIDE AND ACETAMINOPHEN 5; 325 MG/1; MG/1
2 TABLET ORAL ONCE
Status: COMPLETED | OUTPATIENT
Start: 2023-04-18 | End: 2023-04-18

## 2023-04-18 RX ORDER — FOLIC ACID 1 MG/1
1 TABLET ORAL DAILY
COMMUNITY
Start: 2023-02-23

## 2023-04-18 RX ORDER — ONDANSETRON 4 MG/1
8 TABLET, ORALLY DISINTEGRATING ORAL ONCE
Status: COMPLETED | OUTPATIENT
Start: 2023-04-18 | End: 2023-04-18

## 2023-04-18 RX ORDER — OXYCODONE HYDROCHLORIDE AND ACETAMINOPHEN 5; 325 MG/1; MG/1
1 TABLET ORAL EVERY 4 HOURS PRN
Qty: 12 TABLET | Refills: 0 | Status: SHIPPED | OUTPATIENT
Start: 2023-04-18

## 2023-04-18 RX ORDER — ONDANSETRON 4 MG/1
4 TABLET, ORALLY DISINTEGRATING ORAL EVERY 6 HOURS PRN
Qty: 20 TABLET | Refills: 0 | Status: SHIPPED | OUTPATIENT
Start: 2023-04-18

## 2023-04-18 RX ORDER — KETOROLAC TROMETHAMINE 30 MG/ML
15 INJECTION, SOLUTION INTRAMUSCULAR; INTRAVENOUS ONCE
Status: COMPLETED | OUTPATIENT
Start: 2023-04-18 | End: 2023-04-18

## 2023-04-18 RX ADMIN — TAMSULOSIN HYDROCHLORIDE 0.4 MG: 0.4 CAPSULE ORAL at 20:14

## 2023-04-18 RX ADMIN — KETOROLAC TROMETHAMINE 15 MG: 30 INJECTION, SOLUTION INTRAMUSCULAR at 18:46

## 2023-04-18 RX ADMIN — OXYCODONE HYDROCHLORIDE AND ACETAMINOPHEN 2 TABLET: 5; 325 TABLET ORAL at 20:15

## 2023-04-18 RX ADMIN — SODIUM CHLORIDE 1000 ML: 0.9 INJECTION, SOLUTION INTRAVENOUS at 18:44

## 2023-04-18 RX ADMIN — ONDANSETRON 8 MG: 4 TABLET, ORALLY DISINTEGRATING ORAL at 20:15

## 2023-04-18 NOTE — ED PROVIDER NOTES
History  Chief Complaint   Patient presents with   • Flank Pain     Pt c/o onset intermittent left flank pain with nausea starting at 1615 while lying down  Denies travel/sob/fevers/cough/v/d     Patient is a 71-year-old male presenting to the emergency department complaining of sudden onset left flank pain that started around 4:15 PM, the pain waxes and wanes and is severe at times, he denies any vomiting or nausea, no fever or chills, no diarrhea, no dysuria or hematuria, no injury or trauma, no history of similar symptoms previously          Prior to Admission Medications   Prescriptions Last Dose Informant Patient Reported? Taking? Multiple Vitamin (multivitamin) tablet 4/18/2023  Yes Yes   Sig: Take 1 tablet by mouth daily   albuterol (PROVENTIL HFA,VENTOLIN HFA) 90 mcg/act inhaler Unknown  Yes No   Sig: Inhale 2 puffs every 6 (six) hours as needed for wheezing   ferrous sulfate 325 (65 Fe) mg tablet 4/17/2023  Yes Yes   Sig: Take 325 mg by mouth if needed 3 x wk   folic acid (FOLVITE) 1 mg tablet 4/18/2023  Yes Yes   Sig: Take 1 mg by mouth daily   mometasone-formoterol (Dulera) 100-5 MCG/ACT inhaler Unknown  Yes No   Sig: Inhale 2 puffs 2 (two) times a day Rinse mouth after use  Facility-Administered Medications: None       Past Medical History:   Diagnosis Date   • Asthma        History reviewed  No pertinent surgical history  History reviewed  No pertinent family history  I have reviewed and agree with the history as documented  E-Cigarette/Vaping   • E-Cigarette Use Never User      E-Cigarette/Vaping Substances     Social History     Tobacco Use   • Smoking status: Never   • Smokeless tobacco: Never   Vaping Use   • Vaping Use: Never used   Substance Use Topics   • Alcohol use: Yes     Comment: social   • Drug use: Never       Review of Systems   Constitutional: Negative  HENT: Negative  Eyes: Negative  Respiratory: Negative  Cardiovascular: Negative      Gastrointestinal: Negative  Endocrine: Negative  Genitourinary: Positive for flank pain  Skin: Negative  Allergic/Immunologic: Negative  Neurological: Negative  Hematological: Negative  Psychiatric/Behavioral: Negative  Physical Exam  Physical Exam  Constitutional:       Appearance: He is well-developed  HENT:      Head: Normocephalic and atraumatic  Nose: Nose normal       Mouth/Throat:      Mouth: Mucous membranes are moist    Eyes:      Conjunctiva/sclera: Conjunctivae normal       Pupils: Pupils are equal, round, and reactive to light  Cardiovascular:      Rate and Rhythm: Normal rate  Pulmonary:      Effort: Pulmonary effort is normal    Abdominal:      Palpations: Abdomen is soft  Musculoskeletal:         General: Normal range of motion  Cervical back: Normal range of motion and neck supple  Skin:     General: Skin is warm and dry  Neurological:      Mental Status: He is alert and oriented to person, place, and time           Vital Signs  ED Triage Vitals [04/18/23 1806]   Temperature Pulse Respirations Blood Pressure SpO2   97 9 °F (36 6 °C) 86 17 149/84 99 %      Temp Source Heart Rate Source Patient Position - Orthostatic VS BP Location FiO2 (%)   Temporal Monitor Sitting Left arm --      Pain Score       4           Vitals:    04/18/23 1806 04/18/23 2020   BP: 149/84 134/88   Pulse: 86 76   Patient Position - Orthostatic VS: Sitting Lying         Visual Acuity      ED Medications  Medications   sodium chloride 0 9 % bolus 1,000 mL (0 mL Intravenous Stopped 4/18/23 1951)   ketorolac (TORADOL) injection 15 mg (15 mg Intravenous Given 4/18/23 1846)   oxyCODONE-acetaminophen (PERCOCET) 5-325 mg per tablet 2 tablet (2 tablets Oral Given 4/18/23 2015)   ondansetron (ZOFRAN-ODT) dispersible tablet 8 mg (8 mg Oral Given 4/18/23 2015)   tamsulosin (FLOMAX) capsule 0 4 mg (0 4 mg Oral Given 4/18/23 2014)       Diagnostic Studies  Results Reviewed     Procedure Component Value Units Date/Time    UA w Reflex to Microscopic w Reflex to Culture [[id number]] Collected: 04/18/23 1950    Lab Status: Final result Specimen: Urine, Clean Catch Updated: 04/18/23 1956     Color, UA Yellow     Clarity, UA Clear     Specific Gravity, UA 1 015     pH, UA 8 0     Leukocytes, UA Negative     Nitrite, UA Negative     Protein, UA Negative mg/dl      Glucose, UA Negative mg/dl      Ketones, UA Negative mg/dl      Urobilinogen, UA 0 2 E U /dl      Bilirubin, UA Negative     Occult Blood, UA Negative    Comprehensive metabolic panel [[id number]]  (Abnormal) Collected: 04/18/23 1847    Lab Status: Final result Specimen: Blood from Arm, Left Updated: 04/18/23 1909     Sodium 139 mmol/L      Potassium 3 9 mmol/L      Chloride 106 mmol/L      CO2 28 mmol/L      ANION GAP 5 mmol/L      BUN 17 mg/dL      Creatinine 1 34 mg/dL      Glucose 103 mg/dL      Calcium 9 1 mg/dL      AST 24 U/L      ALT 46 U/L      Alkaline Phosphatase 60 U/L      Total Protein 7 1 g/dL      Albumin 4 4 g/dL      Total Bilirubin 0 37 mg/dL      eGFR 65 ml/min/1 73sq m     Narrative:      [place] guidelines for Chronic Kidney Disease (CKD):   •  Stage 1 with normal or high GFR (GFR > 90 mL/min/1 73 square meters)  •  Stage 2 Mild CKD (GFR = 60-89 mL/min/1 73 square meters)  •  Stage 3A Moderate CKD (GFR = 45-59 mL/min/1 73 square meters)  •  Stage 3B Moderate CKD (GFR = 30-44 mL/min/1 73 square meters)  •  Stage 4 Severe CKD (GFR = 15-29 mL/min/1 73 square meters)  •  Stage 5 End Stage CKD (GFR <15 mL/min/1 73 square meters)  Note: GFR calculation is accurate only with a steady state creatinine    Lipase [[id number]]  (Normal) Collected: 04/18/23 1847    Lab Status: Final result Specimen: Blood from Arm, Left Updated: 04/18/23 1909     Lipase 46 u/L     CBC and differential [[id number]] Collected: 04/18/23 1847    Lab Status: Final result Specimen: Blood from Arm, Left Updated: 04/18/23 1851     WBC 8 13 Thousand/uL RBC 4 27 Million/uL      Hemoglobin 12 3 g/dL      Hematocrit 37 7 %      MCV 88 fL      MCH 28 8 pg      MCHC 32 6 g/dL      RDW 12 9 %      MPV 9 6 fL      Platelets 349 Thousands/uL      nRBC 0 /100 WBCs      Neutrophils Relative 54 %      Immat GRANS % 1 %      Lymphocytes Relative 30 %      Monocytes Relative 12 %      Eosinophils Relative 2 %      Basophils Relative 1 %      Neutrophils Absolute 4 46 Thousands/µL      Immature Grans Absolute 0 04 Thousand/uL      Lymphocytes Absolute 2 44 Thousands/µL      Monocytes Absolute 0 98 Thousand/µL      Eosinophils Absolute 0 15 Thousand/µL      Basophils Absolute 0 06 Thousands/µL                  CT renal stone study abdomen pelvis without contrast   Final Result by Gala Cohen MD (04/18 1952)      5 mm proximal left ureteral calculus eliciting mild left hydronephrosis and left renal inflammation  Workstation performed: [id number]                    Procedures  Procedures         ED Course                               SBIRT 22yo+    Flowsheet Row Most Recent Value   Initial Alcohol Screen: US AUDIT-C     1  How often do you have a drink containing alcohol? 1 Filed at: 04/18/2023 1808   2  How many drinks containing alcohol do you have on a typical day you are drinking? 0 Filed at: 04/18/2023 1808   3a  Male UNDER 65: How often do you have five or more drinks on one occasion? 0 Filed at: 04/18/2023 1808   Audit-C Score 1 Filed at: 04/18/2023 6835   RICH: How many times in the past year have you    Used an illegal drug or used a prescription medication for non-medical reasons?  Never Filed at: 04/18/2023 1808                    Medical Decision Making  Patient is an otherwise healthy 65-year-old male presenting for sudden onset left flank pain, the pain waxes and wanes, he denies any additional symptoms, he has not taken anything for pain at home, symptoms concerning for a kidney stone, patient has relatively normal labs, creatinine is noted to be slightly abnormal at 1 34, CT scan shows a 5 mm stone in the left ureter, findings were discussed with patient at bedside, provided with prescription medication for symptom support, advised close follow-up with urology or return if symptoms worsen, patient acknowledges understanding and agreement with this plan    Kidney stone: acute illness or injury  Amount and/or Complexity of Data Reviewed  Labs: ordered  Radiology: ordered  Risk  Prescription drug management  Disposition  Final diagnoses:   Kidney stone     Time reflects when diagnosis was documented in both MDM as applicable and the Disposition within this note     Time User Action Codes Description Comment    4/18/2023  8:09 PM Fabiola Torres Add [N20 0] Kidney stone       ED Disposition     ED Disposition   Discharge    Condition   Stable    Date/Time   Tue Apr 18, 2023  8:09 PM    Comment   Patience Pfeifer  discharge to home/self care                 Follow-up Information     Follow up With Specialties Details Why Kiki Hoyt MD Urology In 3 days  [address] [facility] [address]  [phone]            Discharge Medication List as of 4/18/2023  8:12 PM      START taking these medications    Details   ondansetron (Zofran ODT) 4 mg disintegrating tablet Take 1 tablet (4 mg total) by mouth every 6 (six) hours as needed for nausea or vomiting, Starting Tue 4/18/2023, Normal      oxyCODONE-acetaminophen (Percocet) 5-325 mg per tablet Take 1 tablet by mouth every 4 (four) hours as needed for moderate pain for up to 12 doses Max Daily Amount: 6 tablets, Starting Tue 4/18/2023, Normal      tamsulosin (FLOMAX) 0 4 mg Take 1 capsule (0 4 mg total) by mouth daily with dinner, Starting Tue 4/18/2023, Normal         CONTINUE these medications which have NOT CHANGED    Details   ferrous sulfate 325 (65 Fe) mg tablet Take 325 mg by mouth if needed 3 x wk, Historical Med      folic acid (FOLVITE) 1 mg tablet Take 1 mg by mouth daily, Starting u 2/23/2023, Historical Med      Multiple Vitamin (multivitamin) tablet Take 1 tablet by mouth daily, Historical Med      albuterol (PROVENTIL HFA,VENTOLIN HFA) 90 mcg/act inhaler Inhale 2 puffs every 6 (six) hours as needed for wheezing, Historical Med      mometasone-formoterol (Dulera) 100-5 MCG/ACT inhaler Inhale 2 puffs 2 (two) times a day Rinse mouth after use , Historical Med                 PDMP Review     None          ED Provider  Electronically Signed by           Whitley Escobar DO  04/18/23 4792

## 2023-04-21 RX ORDER — FLUTICASONE PROPIONATE 220 UG/1
1 AEROSOL, METERED RESPIRATORY (INHALATION) 2 TIMES DAILY
COMMUNITY
Start: 2023-02-15

## 2023-04-22 ENCOUNTER — HOSPITAL ENCOUNTER (EMERGENCY)
Facility: HOSPITAL | Age: 43
Discharge: HOME | End: 2023-04-22
Attending: EMERGENCY MEDICINE | Admitting: EMERGENCY MEDICINE

## 2023-04-22 VITALS
SYSTOLIC BLOOD PRESSURE: 140 MMHG | HEIGHT: 67 IN | HEART RATE: 75 BPM | OXYGEN SATURATION: 98 % | TEMPERATURE: 98 F | BODY MASS INDEX: 28.56 KG/M2 | DIASTOLIC BLOOD PRESSURE: 82 MMHG | RESPIRATION RATE: 20 BRPM | WEIGHT: 182 LBS

## 2023-04-22 DIAGNOSIS — N20.1 LEFT URETERAL STONE: Primary | ICD-10-CM

## 2023-04-22 RX ORDER — OXYCODONE HYDROCHLORIDE AND ACETAMINOPHEN 5; 325 MG/1; MG/1
1 TABLET ORAL ONCE
Status: COMPLETED | OUTPATIENT
Start: 2023-04-22 | End: 2023-04-22

## 2023-04-22 RX ORDER — OXYCODONE HYDROCHLORIDE AND ACETAMINOPHEN 5; 325 MG/1; MG/1
1 TABLET ORAL EVERY 8 HOURS PRN
Qty: 10 TABLET | Refills: 0 | Status: SHIPPED | OUTPATIENT
Start: 2023-04-22 | End: 2023-05-02

## 2023-04-22 RX ADMIN — OXYCODONE HYDROCHLORIDE AND ACETAMINOPHEN 1 TABLET: 5; 325 TABLET ORAL at 14:01

## 2023-04-22 NOTE — ED PROVIDER NOTES
History  Chief Complaint   Patient presents with   • Pain     Pt told he had kidney stone on Tuesday, states he was discharged with percocet but is now out, cant get into urology until 4/25     Patient seen here on 4/18 for left flank pain, diagnosed with 5 mm proximal left ureteral stone, prescribed Percocet, states he has run out, appointment with urology as scheduled for 3 days from today  Denies any fevers, urinary difficulty, or multiple episodes of vomiting  Requests pain relief  History provided by:  Patient   used: No    Flank Pain  Pain location:  L flank  Pain quality: aching and sharp    Pain radiates to:  Does not radiate  Pain severity:  Moderate  Onset quality:  Unable to specify  Timing:  Constant  Progression:  Unchanged  Chronicity:  New  Context comment:  Known ureteral stone  Relieved by:  Nothing  Worsened by:  Nothing  Ineffective treatments:  None tried  Associated symptoms: no chest pain, no chills, no constipation, no cough, no diarrhea, no dysuria, no fever, no hematemesis, no hematochezia, no hematuria, no nausea, no shortness of breath, no sore throat and no vomiting        Prior to Admission Medications   Prescriptions Last Dose Informant Patient Reported? Taking? Multiple Vitamin (multivitamin) tablet   Yes No   Sig: Take 1 tablet by mouth daily   albuterol (PROVENTIL HFA,VENTOLIN HFA) 90 mcg/act inhaler   Yes No   Sig: Inhale 2 puffs every 6 (six) hours as needed for wheezing   ferrous sulfate 325 (65 Fe) mg tablet   Yes No   Sig: Take 325 mg by mouth if needed 3 x wk   fluticasone (FLOVENT HFA) 220 mcg/act inhaler   Yes No   Sig: Inhale 1 puff 2 (two) times a day   folic acid (FOLVITE) 1 mg tablet   Yes No   Sig: Take 1 mg by mouth daily   mometasone-formoterol (Dulera) 100-5 MCG/ACT inhaler   Yes No   Sig: Inhale 2 puffs 2 (two) times a day Rinse mouth after use     ondansetron (Zofran ODT) 4 mg disintegrating tablet   No No   Sig: Take 1 tablet (4 mg total) by mouth every 6 (six) hours as needed for nausea or vomiting   oxyCODONE-acetaminophen (Percocet) 5-325 mg per tablet   No No   Sig: Take 1 tablet by mouth every 4 (four) hours as needed for moderate pain for up to 12 doses Max Daily Amount: 6 tablets   tamsulosin (FLOMAX) 0 4 mg   No No   Sig: Take 1 capsule (0 4 mg total) by mouth daily with dinner      Facility-Administered Medications: None       Past Medical History:   Diagnosis Date   • Asthma        History reviewed  No pertinent surgical history  History reviewed  No pertinent family history  I have reviewed and agree with the history as documented  E-Cigarette/Vaping   • E-Cigarette Use Never User      E-Cigarette/Vaping Substances     Social History     Tobacco Use   • Smoking status: Never   • Smokeless tobacco: Never   Vaping Use   • Vaping Use: Never used   Substance Use Topics   • Alcohol use: Yes     Comment: social   • Drug use: Never       Review of Systems   Constitutional: Negative for chills and fever  HENT: Negative for ear pain, hearing loss, sore throat, trouble swallowing and voice change  Eyes: Negative for pain and discharge  Respiratory: Negative for cough, shortness of breath and wheezing  Cardiovascular: Negative for chest pain and palpitations  Gastrointestinal: Negative for abdominal pain, blood in stool, constipation, diarrhea, hematemesis, hematochezia, nausea and vomiting  Genitourinary: Positive for flank pain  Negative for dysuria, frequency and hematuria  Musculoskeletal: Negative for joint swelling, neck pain and neck stiffness  Skin: Negative for rash and wound  Neurological: Negative for dizziness, seizures, syncope, facial asymmetry and headaches  Psychiatric/Behavioral: Negative for hallucinations, self-injury and suicidal ideas  All other systems reviewed and are negative  Physical Exam  Physical Exam  Vitals and nursing note reviewed     Constitutional:       General: He is not in acute distress  Appearance: Normal appearance  He is well-developed  He is not ill-appearing or diaphoretic  HENT:      Head: Normocephalic and atraumatic  Right Ear: External ear normal       Left Ear: External ear normal    Eyes:      General: No scleral icterus  Right eye: No discharge  Left eye: No discharge  Extraocular Movements: Extraocular movements intact  Conjunctiva/sclera: Conjunctivae normal    Pulmonary:      Effort: Pulmonary effort is normal  No respiratory distress  Musculoskeletal:         General: No swelling or deformity  Normal range of motion  Cervical back: Normal range of motion and neck supple  Skin:     General: Skin is dry  Coloration: Skin is not jaundiced or pale  Findings: No rash  Neurological:      General: No focal deficit present  Mental Status: He is alert and oriented to person, place, and time  Cranial Nerves: No cranial nerve deficit  Motor: No weakness  Coordination: Coordination normal       Gait: Gait normal    Psychiatric:         Mood and Affect: Mood normal          Behavior: Behavior normal          Thought Content:  Thought content normal          Judgment: Judgment normal          Vital Signs  ED Triage Vitals [04/22/23 1254]   Temperature Pulse Respirations Blood Pressure SpO2   98 °F (36 7 °C) 75 20 140/82 98 %      Temp Source Heart Rate Source Patient Position - Orthostatic VS BP Location FiO2 (%)   Temporal Monitor Sitting Left arm --      Pain Score       6           Vitals:    04/22/23 1254   BP: 140/82   Pulse: 75   Patient Position - Orthostatic VS: Sitting         Visual Acuity      ED Medications  Medications   oxyCODONE-acetaminophen (PERCOCET) 5-325 mg per tablet 1 tablet (has no administration in time range)       Diagnostic Studies  Results Reviewed     None                 No orders to display              Procedures  Procedures         ED Course SBIRT 20yo+    Flowsheet Row Most Recent Value   Initial Alcohol Screen: US AUDIT-C     1  How often do you have a drink containing alcohol? 0 Filed at: 04/22/2023 1311   2  How many drinks containing alcohol do you have on a typical day you are drinking? 0 Filed at: 04/22/2023 1311   3a  Male UNDER 65: How often do you have five or more drinks on one occasion? 0 Filed at: 04/22/2023 1311   Audit-C Score 0 Filed at: 04/22/2023 1311   RICH: How many times in the past year have you    Used an illegal drug or used a prescription medication for non-medical reasons? Never Filed at: 04/22/2023 1311                    Medical Decision Making  Based on the history and medical screening exam performed the differential diagnosis includes but is not limited to ureteral stone, renal colic  Based on the work-up performed in the emergency room which includes physical examination, and which may include laboratory studies and imaging as warranted including advanced imaging such as CT scan or ultrasound, the differential diagnosis is narrowed to exclude limb or life-threatening process  The patient is stable for discharge  Patient has appointment in 3 days with urology, advised to keep this appointment  It is reasonable to represcribe pain medication given the known ureteral stone  Patient otherwise without new symptoms and appropriate for discharge      Amount and/or Complexity of Data Reviewed  External Data Reviewed: labs, radiology and notes       Details: Recent CT scan showed left ureteral stone, 5 mm, proximal   Labs:      Details: Not indicated  Radiology:      Details: Not indicated          Disposition  Final diagnoses:   Left ureteral stone     Time reflects when diagnosis was documented in both MDM as applicable and the Disposition within this note     Time User Action Codes Description Comment    4/22/2023  1:56 PM Amanda Moreno Add [N20 1] Left ureteral stone       ED Disposition     ED Disposition   Discharge    Condition   Stable    Date/Time   Sat Apr 22, 2023  1:55 PM    Comment   Wojciech Reynaldo  discharge to home/self care  Follow-up Information     Follow up With Specialties Details Why [address],  Internal Medicine   [address]  [phone]            Patient's Medications   Discharge Prescriptions    OXYCODONE-ACETAMINOPHEN (PERCOCET) 5-325 MG PER TABLET    Take 1 tablet by mouth every 8 (eight) hours as needed for moderate pain for up to 10 days Max Daily Amount: 3 tablets       Start Date: 4/22/2023 End Date: 5/2/2023       Order Dose: 1 tablet       Quantity: 10 tablet    Refills: 0       No discharge procedures on file      PDMP Review       Value Time User    PDMP Reviewed  Yes 4/22/2023  1:48 PM Aleah Emmanuel MD          ED Provider  Electronically Signed by           Aleah Emmanuel MD  04/22/23 1062

## 2023-04-25 ENCOUNTER — OFFICE VISIT (OUTPATIENT)
Dept: UROLOGY | Facility: CLINIC | Age: 43
End: 2023-04-25

## 2023-04-25 VITALS — WEIGHT: 180.3 LBS | HEIGHT: 67 IN | BODY MASS INDEX: 28.3 KG/M2 | TEMPERATURE: 97.3 F

## 2023-04-25 DIAGNOSIS — N20.0 KIDNEY STONE: ICD-10-CM

## 2023-04-25 LAB
SL AMB  POCT GLUCOSE, UA: ABNORMAL
SL AMB LEUKOCYTE ESTERASE,UA: ABNORMAL
SL AMB POCT BILIRUBIN,UA: ABNORMAL
SL AMB POCT BLOOD,UA: ABNORMAL
SL AMB POCT CLARITY,UA: CLEAR
SL AMB POCT COLOR,UA: ABNORMAL
SL AMB POCT KETONES,UA: ABNORMAL
SL AMB POCT NITRITE,UA: ABNORMAL
SL AMB POCT PH,UA: 6
SL AMB POCT SPECIFIC GRAVITY,UA: 1.03
SL AMB POCT URINE PROTEIN: ABNORMAL
SL AMB POCT UROBILINOGEN: 1

## 2023-04-25 RX ORDER — CEFAZOLIN SODIUM 2 G/50ML
1000 SOLUTION INTRAVENOUS ONCE
Status: CANCELLED | OUTPATIENT
Start: 2023-04-26 | End: 2023-04-25

## 2023-04-25 RX ORDER — OXYCODONE HYDROCHLORIDE AND ACETAMINOPHEN 5; 325 MG/1; MG/1
1-2 TABLET ORAL EVERY 6 HOURS PRN
Qty: 20 TABLET | Refills: 0 | Status: SHIPPED | OUTPATIENT
Start: 2023-04-25

## 2023-04-25 NOTE — H&P (VIEW-ONLY)
UROLOGY PROGRESS NOTE         NAME: Marston Aase  AGE: 38 y o  SEX: male  : 1980   MRN: [id number]    DATE: 2023  TIME: 10:28 AM    Assessment and Plan      Impression:   1  Kidney stone  -     Ambulatory Referral to Urology  -     POCT urine dip auto non-scope  -     Case request operating room: CYSTOSCOPY URETEROSCOPY WITH LITHOTRIPSY HOLMIUM LASER, RETROGRADE PYELOGRAM AND INSERTION STENT URETERAL; Standing  -     Case request operating room: CYSTOSCOPY URETEROSCOPY WITH LITHOTRIPSY HOLMIUM LASER, RETROGRADE PYELOGRAM AND INSERTION STENT URETERAL      He has failed medical management  He is out of pain pills  He has not passed a stone  Continues to have severe pain is unable to function  Options discussed   Plan: Plan on left ureteroscopy laser lithotripsy stone extraction  Stent placement  Patient understands stent will need to be removed  We will plan on doing this is soon as possible  Chief Complaint     Chief Complaint   Patient presents with   • Nephrolithiasis     History of Present Illness     HPI: Marston Aase  is a 38y o  year old male who presents with history of left ureteral colic  Just over 1 week  Some nausea and vomiting  He is using pain pills on a regular basis  He has been unable to function  Currently not working  Continues with intermittent colicky pain  This is his first stone  No other stones remaining  His job requires him to not be on pain medication  Would like to get back to work as soon as possible                The following portions of the patient's history were reviewed and updated as appropriate: allergies, current medications, past family history, past medical history, past social history, past surgical history and problem list   Past Medical History:   Diagnosis Date   • Asthma    • Kidney stone 2023     Past Surgical History:   Procedure Laterality Date   • TIBIA FRACTURE SURGERY       shoulder  Review of Systems "    Const: Denies chills, fever and weight loss  CV: Denies chest pain  Resp: Denies SOB  GI: Denies abdominal pain, nausea and vomiting  : Denies symptoms other than stated above  Musculo: Denies back pain  Objective   BP (P) 146/82 (BP Location: Left arm, Patient Position: Sitting, Cuff Size: Large)   Pulse (P) 75   Temp (!) 97 3 °F (36 3 °C)   Ht 5' 7\" (1 702 m)   Wt 81 8 kg (180 lb 4 8 oz)   SpO2 (P) 99%   BMI 28 24 kg/m²     Physical Exam  Const: Appears healthy and well developed  No signs of acute distress present  Resp: Respirations are regular and unlabored  CV: Rate is regular  Rhythm is regular  Abdomen: Abdomen is soft, nontender, and nondistended  Kidneys are not palpable  : Pain left lower quadrant, mild left-sided flank pain  Penis testicles epididymis normal   No hernias  Psych: Patient's attitude is cooperative   Mood is normal  Affect is normal     Current Medications     Current Outpatient Medications:   •  albuterol (PROVENTIL HFA,VENTOLIN HFA) 90 mcg/act inhaler, Inhale 2 puffs every 6 (six) hours as needed for wheezing, Disp: , Rfl:   •  ferrous sulfate 325 (65 Fe) mg tablet, Take 325 mg by mouth if needed 3 x wk, Disp: , Rfl:   •  fluticasone (FLOVENT HFA) 220 mcg/act inhaler, Inhale 1 puff 2 (two) times a day, Disp: , Rfl:   •  folic acid (FOLVITE) 1 mg tablet, Take 1 mg by mouth daily, Disp: , Rfl:   •  mometasone-formoterol (Dulera) 100-5 MCG/ACT inhaler, Inhale 2 puffs 2 (two) times a day Rinse mouth after use , Disp: , Rfl:   •  Multiple Vitamin (multivitamin) tablet, Take 1 tablet by mouth daily, Disp: , Rfl:   •  ondansetron (Zofran ODT) 4 mg disintegrating tablet, Take 1 tablet (4 mg total) by mouth every 6 (six) hours as needed for nausea or vomiting, Disp: 20 tablet, Rfl: 0  •  tamsulosin (FLOMAX) 0 4 mg, Take 1 capsule (0 4 mg total) by mouth daily with dinner, Disp: 9 capsule, Rfl: 0  •  oxyCODONE-acetaminophen (Percocet) 5-325 mg per tablet, Take 1 tablet " by mouth every 4 (four) hours as needed for moderate pain for up to 12 doses Max Daily Amount: 6 tablets (Patient not taking: Reported on 4/25/2023), Disp: 12 tablet, Rfl: 0  •  oxyCODONE-acetaminophen (Percocet) 5-325 mg per tablet, Take 1 tablet by mouth every 8 (eight) hours as needed for moderate pain for up to 10 days Max Daily Amount: 3 tablets (Patient not taking: Reported on 4/25/2023), Disp: 10 tablet, Rfl: 0        Betty Gomez MD

## 2023-04-25 NOTE — PROGRESS NOTES
UROLOGY PROGRESS NOTE         NAME: Madelyn Acevedo  AGE: 38 y o  SEX: male  : 1980   MRN: [id number]    DATE: 2023  TIME: 10:28 AM    Assessment and Plan      Impression:   1  Kidney stone  -     Ambulatory Referral to Urology  -     POCT urine dip auto non-scope  -     Case request operating room: CYSTOSCOPY URETEROSCOPY WITH LITHOTRIPSY HOLMIUM LASER, RETROGRADE PYELOGRAM AND INSERTION STENT URETERAL; Standing  -     Case request operating room: CYSTOSCOPY URETEROSCOPY WITH LITHOTRIPSY HOLMIUM LASER, RETROGRADE PYELOGRAM AND INSERTION STENT URETERAL      He has failed medical management  He is out of pain pills  He has not passed a stone  Continues to have severe pain is unable to function  Options discussed   Plan: Plan on left ureteroscopy laser lithotripsy stone extraction  Stent placement  Patient understands stent will need to be removed  We will plan on doing this is soon as possible  Chief Complaint     Chief Complaint   Patient presents with   • Nephrolithiasis     History of Present Illness     HPI: Madelyn Acevedo  is a 38y o  year old male who presents with history of left ureteral colic  Just over 1 week  Some nausea and vomiting  He is using pain pills on a regular basis  He has been unable to function  Currently not working  Continues with intermittent colicky pain  This is his first stone  No other stones remaining  His job requires him to not be on pain medication  Would like to get back to work as soon as possible                The following portions of the patient's history were reviewed and updated as appropriate: allergies, current medications, past family history, past medical history, past social history, past surgical history and problem list   Past Medical History:   Diagnosis Date   • Asthma    • Kidney stone 2023     Past Surgical History:   Procedure Laterality Date   • TIBIA FRACTURE SURGERY       shoulder  Review of Systems "    Const: Denies chills, fever and weight loss  CV: Denies chest pain  Resp: Denies SOB  GI: Denies abdominal pain, nausea and vomiting  : Denies symptoms other than stated above  Musculo: Denies back pain  Objective   BP (P) 146/82 (BP Location: Left arm, Patient Position: Sitting, Cuff Size: Large)   Pulse (P) 75   Temp (!) 97 3 °F (36 3 °C)   Ht 5' 7\" (1 702 m)   Wt 81 8 kg (180 lb 4 8 oz)   SpO2 (P) 99%   BMI 28 24 kg/m²     Physical Exam  Const: Appears healthy and well developed  No signs of acute distress present  Resp: Respirations are regular and unlabored  CV: Rate is regular  Rhythm is regular  Abdomen: Abdomen is soft, nontender, and nondistended  Kidneys are not palpable  : Pain left lower quadrant, mild left-sided flank pain  Penis testicles epididymis normal   No hernias  Psych: Patient's attitude is cooperative   Mood is normal  Affect is normal     Current Medications     Current Outpatient Medications:   •  albuterol (PROVENTIL HFA,VENTOLIN HFA) 90 mcg/act inhaler, Inhale 2 puffs every 6 (six) hours as needed for wheezing, Disp: , Rfl:   •  ferrous sulfate 325 (65 Fe) mg tablet, Take 325 mg by mouth if needed 3 x wk, Disp: , Rfl:   •  fluticasone (FLOVENT HFA) 220 mcg/act inhaler, Inhale 1 puff 2 (two) times a day, Disp: , Rfl:   •  folic acid (FOLVITE) 1 mg tablet, Take 1 mg by mouth daily, Disp: , Rfl:   •  mometasone-formoterol (Dulera) 100-5 MCG/ACT inhaler, Inhale 2 puffs 2 (two) times a day Rinse mouth after use , Disp: , Rfl:   •  Multiple Vitamin (multivitamin) tablet, Take 1 tablet by mouth daily, Disp: , Rfl:   •  ondansetron (Zofran ODT) 4 mg disintegrating tablet, Take 1 tablet (4 mg total) by mouth every 6 (six) hours as needed for nausea or vomiting, Disp: 20 tablet, Rfl: 0  •  tamsulosin (FLOMAX) 0 4 mg, Take 1 capsule (0 4 mg total) by mouth daily with dinner, Disp: 9 capsule, Rfl: 0  •  oxyCODONE-acetaminophen (Percocet) 5-325 mg per tablet, Take 1 tablet " by mouth every 4 (four) hours as needed for moderate pain for up to 12 doses Max Daily Amount: 6 tablets (Patient not taking: Reported on 4/25/2023), Disp: 12 tablet, Rfl: 0  •  oxyCODONE-acetaminophen (Percocet) 5-325 mg per tablet, Take 1 tablet by mouth every 8 (eight) hours as needed for moderate pain for up to 10 days Max Daily Amount: 3 tablets (Patient not taking: Reported on 4/25/2023), Disp: 10 tablet, Rfl: 0        Suman Wells MD

## 2023-04-25 NOTE — PRE-PROCEDURE INSTRUCTIONS
Pre-Surgery Instructions:   Medication Instructions   • albuterol (PROVENTIL HFA,VENTOLIN HFA) 90 mcg/act inhaler Uses PRN- OK to take day of surgery   • ferrous sulfate 325 (65 Fe) mg tablet Hold day of surgery  • fluticasone (FLOVENT HFA) 220 mcg/act inhaler Take day of surgery  • folic acid (FOLVITE) 1 mg tablet Hold day of surgery  • mometasone-formoterol (Dulera) 100-5 MCG/ACT inhaler Take day of surgery  • Multiple Vitamin (multivitamin) tablet Hold day of surgery  • ondansetron (Zofran ODT) 4 mg disintegrating tablet Uses PRN- OK to take day of surgery   • oxyCODONE-acetaminophen (PERCOCET) 5-325 mg per tablet Uses PRN- OK to take day of surgery   • tamsulosin (FLOMAX) 0 4 mg Take night before surgery   Medication instructions for day surgery reviewed  Please use only a sip of water to take your instructed medications  Avoid all over the counter vitamins, supplements and NSAIDS for one week prior to surgery per anesthesia guidelines  Tylenol is ok to take as needed  You will receive a call one business day prior to surgery with an arrival time and hospital directions  If your surgery is scheduled on a Monday, the hospital will be calling you on the Friday prior to your surgery  If you have not heard from anyone by 8pm, please call the hospital supervisor through the hospital  at [phone]  [place] [phone])  Do not eat or drink anything after midnight the night before your surgery, including candy, mints, lifesavers, or chewing gum  Do not drink alcohol 24hrs before your surgery  Try not to smoke at least 24hrs before your surgery  Follow the pre surgery showering instructions as listed in the [facility] Surgical Experience Booklet” or otherwise provided by your surgeon's office  Do not shave the surgical area 24 hours before surgery  Do not apply any lotions, creams, including makeup, cologne, deodorant, or perfumes after showering on the day of your surgery       No contact lenses, eye make-up, or artificial eyelashes  Remove nail polish, including gel polish, and any artificial, gel, or acrylic nails if possible  Remove all jewelry including rings and body piercing jewelry  Wear causal clothing that is easy to take on and off  Consider your type of surgery  Keep any valuables, jewelry, piercings at home  Please bring any specially ordered equipment (sling, braces) if indicated  Arrange for a responsible person to drive you to and from the hospital on the day of your surgery  Visitor Guidelines discussed  Call the surgeon's office with any new illnesses, exposures, or additional questions prior to surgery  Please reference your [facility] Surgical Experience Booklet” for additional information to prepare for your upcoming surgery

## 2023-04-26 ENCOUNTER — ANESTHESIA EVENT (OUTPATIENT)
Dept: PERIOP | Facility: HOSPITAL | Age: 43
End: 2023-04-26

## 2023-04-26 ENCOUNTER — HOSPITAL ENCOUNTER (OUTPATIENT)
Facility: HOSPITAL | Age: 43
Setting detail: OUTPATIENT SURGERY
Discharge: HOME | End: 2023-04-26
Attending: UROLOGY | Admitting: UROLOGY

## 2023-04-26 ENCOUNTER — ANESTHESIA (OUTPATIENT)
Dept: PERIOP | Facility: HOSPITAL | Age: 43
End: 2023-04-26

## 2023-04-26 ENCOUNTER — APPOINTMENT (OUTPATIENT)
Dept: RADIOLOGY | Facility: HOSPITAL | Age: 43
End: 2023-04-26

## 2023-04-26 VITALS
WEIGHT: 180 LBS | OXYGEN SATURATION: 98 % | DIASTOLIC BLOOD PRESSURE: 63 MMHG | BODY MASS INDEX: 28.25 KG/M2 | TEMPERATURE: 97.7 F | HEART RATE: 76 BPM | RESPIRATION RATE: 18 BRPM | SYSTOLIC BLOOD PRESSURE: 130 MMHG | HEIGHT: 67 IN

## 2023-04-26 DIAGNOSIS — N20.1 URETERAL CALCULUS: Primary | ICD-10-CM

## 2023-04-26 DEVICE — INLAY OPTIMA URETERAL STENT W/O GUIDEWIRE
Type: IMPLANTABLE DEVICE | Site: URETER | Status: FUNCTIONAL
Brand: BARD® INLAY OPTIMA® URETERAL STENT

## 2023-04-26 RX ORDER — LIDOCAINE HYDROCHLORIDE 10 MG/ML
INJECTION, SOLUTION EPIDURAL; INFILTRATION; INTRACAUDAL; PERINEURAL AS NEEDED
Status: DISCONTINUED | OUTPATIENT
Start: 2023-04-26 | End: 2023-04-26

## 2023-04-26 RX ORDER — FENTANYL CITRATE 50 UG/ML
INJECTION, SOLUTION INTRAMUSCULAR; INTRAVENOUS AS NEEDED
Status: DISCONTINUED | OUTPATIENT
Start: 2023-04-26 | End: 2023-04-26

## 2023-04-26 RX ORDER — MAGNESIUM HYDROXIDE 1200 MG/15ML
LIQUID ORAL AS NEEDED
Status: DISCONTINUED | OUTPATIENT
Start: 2023-04-26 | End: 2023-04-26 | Stop reason: HOSPADM

## 2023-04-26 RX ORDER — CEFUROXIME AXETIL 500 MG/1
500 TABLET ORAL EVERY 12 HOURS SCHEDULED
Qty: 14 TABLET | Refills: 0 | Status: SHIPPED | OUTPATIENT
Start: 2023-04-26 | End: 2023-05-03

## 2023-04-26 RX ORDER — CEFAZOLIN SODIUM 2 G/50ML
2000 SOLUTION INTRAVENOUS ONCE
Status: COMPLETED | OUTPATIENT
Start: 2023-04-26 | End: 2023-04-26

## 2023-04-26 RX ORDER — MIDAZOLAM HYDROCHLORIDE 2 MG/2ML
INJECTION, SOLUTION INTRAMUSCULAR; INTRAVENOUS AS NEEDED
Status: DISCONTINUED | OUTPATIENT
Start: 2023-04-26 | End: 2023-04-26

## 2023-04-26 RX ORDER — ALBUTEROL SULFATE 2.5 MG/3ML
2.5 SOLUTION RESPIRATORY (INHALATION) ONCE AS NEEDED
Status: DISCONTINUED | OUTPATIENT
Start: 2023-04-26 | End: 2023-04-26 | Stop reason: HOSPADM

## 2023-04-26 RX ORDER — CEFAZOLIN SODIUM 2 G/50ML
1000 SOLUTION INTRAVENOUS ONCE
Status: DISCONTINUED | OUTPATIENT
Start: 2023-04-26 | End: 2023-04-26

## 2023-04-26 RX ORDER — DEXAMETHASONE SODIUM PHOSPHATE 10 MG/ML
INJECTION, SOLUTION INTRAMUSCULAR; INTRAVENOUS AS NEEDED
Status: DISCONTINUED | OUTPATIENT
Start: 2023-04-26 | End: 2023-04-26

## 2023-04-26 RX ORDER — SODIUM CHLORIDE, SODIUM LACTATE, POTASSIUM CHLORIDE, CALCIUM CHLORIDE 600; 310; 30; 20 MG/100ML; MG/100ML; MG/100ML; MG/100ML
125 INJECTION, SOLUTION INTRAVENOUS CONTINUOUS
Status: DISCONTINUED | OUTPATIENT
Start: 2023-04-26 | End: 2023-04-26 | Stop reason: HOSPADM

## 2023-04-26 RX ORDER — PROPOFOL 10 MG/ML
INJECTION, EMULSION INTRAVENOUS AS NEEDED
Status: DISCONTINUED | OUTPATIENT
Start: 2023-04-26 | End: 2023-04-26

## 2023-04-26 RX ORDER — ONDANSETRON 2 MG/ML
4 INJECTION INTRAMUSCULAR; INTRAVENOUS ONCE AS NEEDED
Status: DISCONTINUED | OUTPATIENT
Start: 2023-04-26 | End: 2023-04-26 | Stop reason: HOSPADM

## 2023-04-26 RX ORDER — LIDOCAINE HYDROCHLORIDE 20 MG/ML
JELLY TOPICAL AS NEEDED
Status: DISCONTINUED | OUTPATIENT
Start: 2023-04-26 | End: 2023-04-26 | Stop reason: HOSPADM

## 2023-04-26 RX ORDER — SODIUM CHLORIDE, SODIUM LACTATE, POTASSIUM CHLORIDE, CALCIUM CHLORIDE 600; 310; 30; 20 MG/100ML; MG/100ML; MG/100ML; MG/100ML
INJECTION, SOLUTION INTRAVENOUS CONTINUOUS PRN
Status: DISCONTINUED | OUTPATIENT
Start: 2023-04-26 | End: 2023-04-26

## 2023-04-26 RX ORDER — FENTANYL CITRATE/PF 50 MCG/ML
25 SYRINGE (ML) INJECTION
Status: DISCONTINUED | OUTPATIENT
Start: 2023-04-26 | End: 2023-04-26 | Stop reason: HOSPADM

## 2023-04-26 RX ORDER — PHENAZOPYRIDINE HYDROCHLORIDE 100 MG/1
200 TABLET, FILM COATED ORAL
Status: DISCONTINUED | OUTPATIENT
Start: 2023-04-26 | End: 2023-04-26 | Stop reason: HOSPADM

## 2023-04-26 RX ADMIN — PROPOFOL 180 MG: 10 INJECTION, EMULSION INTRAVENOUS at 12:14

## 2023-04-26 RX ADMIN — FENTANYL CITRATE 25 MCG: 50 INJECTION INTRAMUSCULAR; INTRAVENOUS at 12:14

## 2023-04-26 RX ADMIN — FENTANYL CITRATE 25 MCG: 50 INJECTION INTRAMUSCULAR; INTRAVENOUS at 12:23

## 2023-04-26 RX ADMIN — SODIUM CHLORIDE, SODIUM LACTATE, POTASSIUM CHLORIDE, AND CALCIUM CHLORIDE: .6; .31; .03; .02 INJECTION, SOLUTION INTRAVENOUS at 12:11

## 2023-04-26 RX ADMIN — FENTANYL CITRATE 50 MCG: 50 INJECTION INTRAMUSCULAR; INTRAVENOUS at 12:20

## 2023-04-26 RX ADMIN — LIDOCAINE HYDROCHLORIDE 50 MG: 10 INJECTION, SOLUTION EPIDURAL; INFILTRATION; INTRACAUDAL at 12:14

## 2023-04-26 RX ADMIN — MIDAZOLAM 2 MG: 1 INJECTION INTRAMUSCULAR; INTRAVENOUS at 12:11

## 2023-04-26 RX ADMIN — FENTANYL CITRATE 50 MCG: 50 INJECTION INTRAMUSCULAR; INTRAVENOUS at 12:25

## 2023-04-26 RX ADMIN — FENTANYL CITRATE 50 MCG: 50 INJECTION INTRAMUSCULAR; INTRAVENOUS at 12:30

## 2023-04-26 RX ADMIN — CEFAZOLIN SODIUM 2000 MG: 2 SOLUTION INTRAVENOUS at 12:11

## 2023-04-26 RX ADMIN — PHENAZOPYRIDINE 200 MG: 100 TABLET ORAL at 13:04

## 2023-04-26 RX ADMIN — DEXAMETHASONE SODIUM PHOSPHATE 8 MG: 10 INJECTION, SOLUTION INTRAMUSCULAR; INTRAVENOUS at 12:21

## 2023-04-26 NOTE — ANESTHESIA POSTPROCEDURE EVALUATION
Post-Op Assessment Note    CV Status:  Stable    Pain management: adequate     Mental Status:  Sleepy   Hydration Status:  Euvolemic   PONV Controlled:  Controlled   Airway Patency:  Patent      Post Op Vitals Reviewed: Yes      Staff: CRNA         No notable events documented      BP   106/56   Temp   97 2   Pulse  63   Resp   18   SpO2   98%

## 2023-04-26 NOTE — OP NOTE
OPERATIVE REPORT  PATIENT NAME: Joshua Brown  :  1980  MRN: [id number]  Pt Location: [facility] OR ROOM 01    SURGERY DATE: 2023    Surgeon(s) and Role:     Jeane Lama MD - Primary    Preop Diagnosis:  Kidney stone [N20 0]    Post-Op Diagnosis Codes:     * Kidney stone [N20 0]    Procedure(s):  Left - CYSTOSCOPY URETEROSCOPY WITH LITHOTRIPSY HOLMIUM LASER  INSERTION STENT URETERAL    Specimen(s):  * No specimens in log *    Estimated Blood Loss:   Minimal    Drains:  Ureteral Internal Stent Left ureter 6 Fr  (Active)   Number of days: 0       Anesthesia Type:   General/LMA    Operative Indications:  Kidney stone [N20 0]  Left ureteral stone failed medical management  Stone effectively treated with laser  Stent placed  Stent can come out in 5 to 7 days  Operative Findings:  Proximal left ureteral calculus    Complications:   None    Procedure and Technique:  Patient brought to the operating room  Identified  Timeout  SCDs  Antibiotics  Dorsolithotomy position  Prepped and draped in sterile fashion  25 Upper sorbian cystoscopy revealed normal urethra prostate and bladder  No tumors  No diverticuli  No erythema  Guidewire was placed up to level the left kidney under fluoroscopy  The stone was in the same position on the CAT scan in the proximal ureter  Stone could be seen under fluoroscopy  A reentry sheath was placed up to level the stone without difficulty  Flexible ureteroscopy was then carried out stone was easily visualized  The guidewire was left in position  The stone was fragmented with a 400 µm laser fiber holmium laser and the powder setting  Stone fragmented nicely  There were no significant sized fragments remaining  There were no other stones  No tumors  No injury to the ureter  The sheath was removed  The safety wire was used to place a double-J stent  6 [place]  26 cm  String removed  Coil in the kidney coil in the bladder  Patient Toller procedure well  Brought the recovery room in stable condition  He will go home on antibiotics  We will have him back for cystoscopy and stent removal in 5 to 7 days  He has pain medicine at home already  Instructions follow-up arrangements and prescription discussed  I was present for the entire procedure      Patient Disposition:  PACU         SIGNATURE: Tiago Seo MD  DATE: April 26, 2023  TIME: 12:43 PM

## 2023-04-26 NOTE — INTERVAL H&P NOTE
H&P reviewed  After examining the patient I find no changes in the patients condition since the H&P had been written      Vitals:    04/26/23 1125   BP: 134/75   Pulse: 84   Resp: 18   Temp: 97 9 °F (36 6 °C)   SpO2: 96%

## 2023-04-26 NOTE — ANESTHESIA PREPROCEDURE EVALUATION
Procedure:  CYSTOSCOPY URETEROSCOPY WITH LITHOTRIPSY HOLMIUM LASER, RETROGRADE PYELOGRAM AND INSERTION STENT URETERAL (Left: Ureter)    Relevant Problems   PULMONARY   (+) Asthma with routine monitoring   no recent asthma exacerbations  Last used inhaler 1 week ago  No ED visits for asthma exacerbation    NPO appropriate  No prior anesthetic complications  Denies smoking, etoh and illicit drug use    nephrolithiasis  Overweight  LEFT ulnar mononeuropathy    Physical Exam    Airway    Mallampati score: II  TM Distance: >3 FB  Neck ROM: full     Dental   No notable dental hx     Cardiovascular  Rhythm: regular, Rate: normal,     Pulmonary  Breath sounds clear to auscultation,     Other Findings        Anesthesia Plan  ASA Score- 2     Anesthesia Type- general with ASA Monitors  Additional Monitors:   Airway Plan: LMA  Comment: Risks/benefits and alternatives discussed with patient including possible PONV, sore throat, damage to teeth/lips/gums/esophagus, and possibility of rare anesthetic and surgical emergencies including but not limited to heart attack, stroke, and/or death  All questions were answered          Plan Factors-Exercise tolerance (METS): >4 METS  Chart reviewed  Existing labs reviewed  Patient summary reviewed  Patient is not a current smoker  Induction- intravenous  Postoperative Plan- Plan for postoperative opioid use  Informed Consent- Anesthetic plan and risks discussed with patient and father  I personally reviewed this patient with the CRNA  Discussed and agreed on the Anesthesia Plan with the CRNA  Taco Champagne

## 2023-05-02 ENCOUNTER — TELEPHONE (OUTPATIENT)
Dept: UROLOGY | Facility: CLINIC | Age: 43
End: 2023-05-02

## 2023-05-02 NOTE — TELEPHONE ENCOUNTER
Left message for pt to call office back  Appt time needs to be moved to 1:30 please let pt know when he calls back

## 2023-05-03 ENCOUNTER — PROCEDURE VISIT (OUTPATIENT)
Dept: UROLOGY | Facility: CLINIC | Age: 43
End: 2023-05-03

## 2023-05-03 VITALS — BODY MASS INDEX: 27.5 KG/M2 | HEIGHT: 67 IN | TEMPERATURE: 97.1 F | WEIGHT: 175.2 LBS

## 2023-05-03 DIAGNOSIS — N20.0 KIDNEY STONE: Primary | ICD-10-CM

## 2023-05-03 LAB
SL AMB  POCT GLUCOSE, UA: ABNORMAL
SL AMB LEUKOCYTE ESTERASE,UA: ABNORMAL
SL AMB POCT BILIRUBIN,UA: ABNORMAL
SL AMB POCT BLOOD,UA: ABNORMAL
SL AMB POCT CLARITY,UA: ABNORMAL
SL AMB POCT COLOR,UA: ABNORMAL
SL AMB POCT KETONES,UA: ABNORMAL
SL AMB POCT NITRITE,UA: ABNORMAL
SL AMB POCT PH,UA: 5.5
SL AMB POCT SPECIFIC GRAVITY,UA: 1.03
SL AMB POCT URINE PROTEIN: ABNORMAL
SL AMB POCT UROBILINOGEN: 0.2

## 2023-05-03 NOTE — PROGRESS NOTES
UROLOGY PROGRESS NOTE         NAME: Tisha Parsosn  AGE: 38 y o  SEX: male  : 1980   MRN: [id number]    DATE: 5/3/2023  TIME: 8:45 AM    Assessment and Plan      Impression:   1  Kidney stone  -     POCT urine dip auto non-scope  -     Cystoscopy    Successful ureteroscopy with laser lithotripsy left side  Stent removed today  For stone     Plan: He will hydrate well 12 cups of water per day  More when he sweats and drinks alcohol  He will speak up if there is any troubles follow-up as needed  Chief Complaint   No chief complaint on file  History of Present Illness     HPI: Tisha Parsons  is a 38y o  year old male who presents with cystoscopy and stent removal   Patient underwent ureteroscopy a week ago for a proximal stone  Katt Cunningham was fragmented well  He comes in today for stent removal   He had no fever or chills  He has been back to work  He has had intermittent blood in his urine  The following portions of the patient's history were reviewed and updated as appropriate: allergies, current medications, past family history, past medical history, past social history, past surgical history and problem list   Past Medical History:   Diagnosis Date    Asthma     Kidney stone 2023     Past Surgical History:   Procedure Laterality Date    KNEE SURGERY Left     [place] CYSTO/URETERO W/LITHOTRIPSY &INDWELL STENT INSRT Left 2023    Procedure: CYSTOSCOPY URETEROSCOPY WITH LITHOTRIPSY HOLMIUM LASER, INSERTION STENT URETERAL;  Surgeon: Kacie Jacobsen MD;  Location: [facility] OR;  Service: Urology    TIBIA FRACTURE SURGERY       shoulder  Review of Systems     Const: Denies chills, fever and weight loss  CV: Denies chest pain  Resp: Denies SOB  GI: Denies abdominal pain, nausea and vomiting  : Denies symptoms other than stated above  Musculo: Denies back pain      Objective   BP (P) 128/82 (BP Location: Left arm, Patient Position: Sitting, Cuff Size: Adult) "Pulse (P) 82   Temp (!) 97 1 °F (36 2 °C)   Ht 5' 7\" (1 702 m)   Wt 79 5 kg (175 lb 3 2 oz)   SpO2 (P) 98%   BMI 27 44 kg/m²     Physical Exam  Const: Appears healthy and well developed  No signs of acute distress present  Resp: Respirations are regular and unlabored  CV: Rate is regular  Rhythm is regular  Abdomen: Abdomen is soft, nontender, and nondistended  Kidneys are not palpable  : Normal penis  Psych: Patient's attitude is cooperative   Mood is normal  Affect is normal     Current Medications     Current Outpatient Medications:     albuterol (PROVENTIL HFA,VENTOLIN HFA) 90 mcg/act inhaler, Inhale 2 puffs every 6 (six) hours as needed for wheezing, Disp: , Rfl:     cefuroxime (CEFTIN) 500 mg tablet, Take 1 tablet (500 mg total) by mouth every 12 (twelve) hours for 14 doses, Disp: 14 tablet, Rfl: 0    ferrous sulfate 325 (65 Fe) mg tablet, Take 325 mg by mouth if needed 3 x wk, Disp: , Rfl:     fluticasone (FLOVENT HFA) 220 mcg/act inhaler, Inhale 1 puff 2 (two) times a day, Disp: , Rfl:     folic acid (FOLVITE) 1 mg tablet, Take 1 mg by mouth daily, Disp: , Rfl:     Multiple Vitamin (multivitamin) tablet, Take 1 tablet by mouth daily, Disp: , Rfl:     ondansetron (Zofran ODT) 4 mg disintegrating tablet, Take 1 tablet (4 mg total) by mouth every 6 (six) hours as needed for nausea or vomiting, Disp: 20 tablet, Rfl: 0    oxyCODONE-acetaminophen (PERCOCET) 5-325 mg per tablet, Take 1-2 tablets by mouth every 6 (six) hours as needed for moderate pain or severe pain Max Daily Amount: 8 tablets, Disp: 20 tablet, Rfl: 0    oxyCODONE-acetaminophen (Percocet) 5-325 mg per tablet, Take 1 tablet by mouth every 4 (four) hours as needed for moderate pain for up to 12 doses Max Daily Amount: 6 tablets (Patient not taking: Reported on 4/25/2023), Disp: 12 tablet, Rfl: 0    tamsulosin (FLOMAX) 0 4 mg, Take 1 capsule (0 4 mg total) by mouth daily with dinner (Patient not taking: Reported on 5/3/2023), " Disp: 9 capsule, Rfl: 0        Gareth Rey MD

## 2023-05-03 NOTE — PROGRESS NOTES
"   Cystoscopy     Date/Time 5/3/2023 8:00 AM     Performed by  Yfn Choe MD     Authorized by Yfn Choe MD      Universal Protocol:  Consent: Verbal consent obtained  Written consent obtained  Consent given by: patient  Time out: Immediately prior to procedure a \"time out\" was called to verify the correct patient, procedure, equipment, support staff and site/side marked as required  Timeout called at: 5/3/2023 8:44 AM   Patient identity confirmed: verbally with patient        Procedure Details:  Procedure type: simple removal of a foreign body, stone, or stent    Patient tolerance: Patient tolerated the procedure well with no immediate complications    Additional Procedure Details: Patient underwent cystoscopy stent removal today  No complications  He was prepped with Betadine and lidocaine jelly  Supine position  Flexible digital cystoscopy was carried out  He had a normal urethra normal prostate and normal bladder  The stent was removed without difficulty using grasping forceps  This was done under direct vision  The patient and his father were able to visualize the procedure on the screen  Patient will hydrate well  He will speak up if there is any troubles        "

## 2023-07-19 ENCOUNTER — OFFICE VISIT (OUTPATIENT)
Dept: URGENT CARE | Facility: CLINIC | Age: 43
End: 2023-07-19
Payer: COMMERCIAL

## 2023-07-19 VITALS
SYSTOLIC BLOOD PRESSURE: 139 MMHG | HEIGHT: 68 IN | HEART RATE: 84 BPM | WEIGHT: 179 LBS | DIASTOLIC BLOOD PRESSURE: 78 MMHG | BODY MASS INDEX: 27.13 KG/M2 | TEMPERATURE: 98 F | OXYGEN SATURATION: 99 % | RESPIRATION RATE: 18 BRPM

## 2023-07-19 DIAGNOSIS — J02.9 ACUTE PHARYNGITIS, UNSPECIFIED ETIOLOGY: Primary | ICD-10-CM

## 2023-07-19 LAB — S PYO AG THROAT QL: NEGATIVE

## 2023-07-19 PROCEDURE — 99213 OFFICE O/P EST LOW 20 MIN: CPT

## 2023-07-19 PROCEDURE — 87880 STREP A ASSAY W/OPTIC: CPT

## 2023-07-19 PROCEDURE — 87070 CULTURE OTHR SPECIMN AEROBIC: CPT

## 2023-07-19 RX ORDER — AMOXICILLIN 500 MG/1
1000 CAPSULE ORAL EVERY 24 HOURS
Qty: 20 CAPSULE | Refills: 0 | Status: SHIPPED | OUTPATIENT
Start: 2023-07-19 | End: 2023-07-29

## 2023-07-19 NOTE — PATIENT INSTRUCTIONS
Rapid strep negative we will send throat culture. Warm salt water gargles, throat lozenges such as Cepacol, honey, hot tea will be soothing to your throat and help with your symptoms. If your symptoms persist for the next 2 days then start the antibiotics.

## 2023-07-19 NOTE — PROGRESS NOTES
[facility]        NAME: Leidy Rossi. is a 38 y.o. male  : 1980    MRN: [id number]  DATE: 2023  TIME: 5:57 PM    Assessment and Plan   Acute pharyngitis, unspecified etiology [J02.9]  1. Acute pharyngitis, unspecified etiology  POCT rapid strepA    amoxicillin (AMOXIL) 500 mg capsule    Throat culture        Rapid strep negative symptoms consistent with viral illness recommend supportive care we will send throat culture discussed with patient that I did send antibiotics to the pharmacy if the throat culture does come back positive or if he develops worsening symptoms with a fever. Patient Instructions   Rapid strep negative we will send throat culture. Warm salt water gargles, throat lozenges such as Cepacol, honey, hot tea will be soothing to your throat and help with your symptoms. If your symptoms persist for the next 2 days then start the antibiotics. Follow up with PCP in 3-5 days. Proceed to  ER if symptoms worsen. Chief Complaint     Chief Complaint   Patient presents with   • Sore Throat   • Nasal Congestion   • Dizziness         History of Present Illness       Patient is a 46-year-old male who presents to the office today for sore throat. He states is been going on on since . He is concerned that it might be related to the poor air quality and humid high humidity. He has been taking Mucinex and DayQuil with minimal relief. Denies any fevers. The other symptom that he has is congestion. He states that he has an air conditioner running at home and his throat hurts worse in the morning and gets better throughout the day. Review of Systems   Review of Systems   Constitutional: Negative for chills and fever. HENT: Positive for congestion, postnasal drip and sore throat. Negative for drooling, ear pain, rhinorrhea, sinus pressure, sinus pain and trouble swallowing. Respiratory: Negative for cough, shortness of breath and wheezing. Cardiovascular: Negative for palpitations. Gastrointestinal: Negative for diarrhea, nausea and vomiting. All other systems reviewed and are negative.         Current Medications       Current Outpatient Medications:   •  amoxicillin (AMOXIL) 500 mg capsule, Take 2 capsules (1,000 mg total) by mouth every 24 hours for 10 days, Disp: 20 capsule, Rfl: 0  •  albuterol (PROVENTIL HFA,VENTOLIN HFA) 90 mcg/act inhaler, Inhale 2 puffs every 6 (six) hours as needed for wheezing, Disp: , Rfl:   •  ferrous sulfate 325 (65 Fe) mg tablet, Take 325 mg by mouth if needed 3 x wk, Disp: , Rfl:   •  fluticasone (FLOVENT HFA) 220 mcg/act inhaler, Inhale 1 puff 2 (two) times a day, Disp: , Rfl:   •  folic acid (FOLVITE) 1 mg tablet, Take 1 mg by mouth daily, Disp: , Rfl:   •  Multiple Vitamin (multivitamin) tablet, Take 1 tablet by mouth daily, Disp: , Rfl:   •  ondansetron (Zofran ODT) 4 mg disintegrating tablet, Take 1 tablet (4 mg total) by mouth every 6 (six) hours as needed for nausea or vomiting, Disp: 20 tablet, Rfl: 0  •  oxyCODONE-acetaminophen (Percocet) 5-325 mg per tablet, Take 1 tablet by mouth every 4 (four) hours as needed for moderate pain for up to 12 doses Max Daily Amount: 6 tablets (Patient not taking: Reported on 4/25/2023), Disp: 12 tablet, Rfl: 0  •  oxyCODONE-acetaminophen (PERCOCET) 5-325 mg per tablet, Take 1-2 tablets by mouth every 6 (six) hours as needed for moderate pain or severe pain Max Daily Amount: 8 tablets, Disp: 20 tablet, Rfl: 0  •  tamsulosin (FLOMAX) 0.4 mg, Take 1 capsule (0.4 mg total) by mouth daily with dinner (Patient not taking: Reported on 5/3/2023), Disp: 9 capsule, Rfl: 0    Current Allergies     Allergies as of 07/19/2023 - Reviewed 07/19/2023   Allergen Reaction Noted   • Sulfa antibiotics Hives 09/20/2022   • Bee venom Swelling 08/11/2000   • Other Other (See Comments) 08/27/2018            The following portions of the patient's history were reviewed and updated as appropriate: allergies, current medications, past family history, past medical history, past social history, past surgical history and problem list.     Past Medical History:   Diagnosis Date   • Asthma    • Kidney stone 04/18/2023       Past Surgical History:   Procedure Laterality Date   • KNEE SURGERY Left    • [place] CYSTO/URETERO W/LITHOTRIPSY &INDWELL STENT INSRT Left 4/26/2023    Procedure: CYSTOSCOPY URETEROSCOPY WITH LITHOTRIPSY HOLMIUM LASER, INSERTION STENT URETERAL;  Surgeon: Eugenia Jade MD;  Location:  MAIN OR;  Service: Urology   • TIBIA FRACTURE SURGERY         Family History   Problem Relation Age of Onset   • Diabetes Mother    • Diabetes Father    • Hypertension Father    • Kidney disease Father    • Stroke Father    • Cancer Maternal Grandfather         Throat cancer   • Diabetes Maternal Grandfather    • Diabetes Maternal Grandmother    • Diabetes Maternal Uncle          Medications have been verified. Objective   /78   Pulse 84   Temp 98 °F (36.7 °C)   Resp 18   Ht 5' 7.5" (1.715 m)   Wt 81.2 kg (179 lb)   SpO2 99%   BMI 27.62 kg/m²        Physical Exam     Physical Exam  Vitals and nursing note reviewed. Constitutional:       Appearance: He is well-developed and normal weight. HENT:      Right Ear: Tympanic membrane and ear canal normal.      Left Ear: Tympanic membrane and ear canal normal.      Mouth/Throat:      Mouth: Mucous membranes are moist.      Pharynx: Posterior oropharyngeal erythema present. Tonsils: No tonsillar exudate. 0 on the right. 0 on the left. Comments: Posterior pharynx erythema noted with postnasal drip. Neck:      Thyroid: No thyromegaly. Cardiovascular:      Rate and Rhythm: Normal rate and regular rhythm. Heart sounds: Normal heart sounds. Pulmonary:      Effort: Pulmonary effort is normal.      Breath sounds: Normal breath sounds. Lymphadenopathy:      Cervical: No cervical adenopathy. Skin:     General: Skin is warm. Capillary Refill: Capillary refill takes less than 2 seconds. Neurological:      Mental Status: He is alert. No

## 2023-07-21 LAB — BACTERIA THROAT CULT: NORMAL

## 2023-08-08 ENCOUNTER — OFFICE VISIT (OUTPATIENT)
Dept: URGENT CARE | Facility: CLINIC | Age: 43
End: 2023-08-08
Payer: COMMERCIAL

## 2023-08-08 VITALS
OXYGEN SATURATION: 99 % | HEART RATE: 75 BPM | DIASTOLIC BLOOD PRESSURE: 95 MMHG | SYSTOLIC BLOOD PRESSURE: 161 MMHG | TEMPERATURE: 97.9 F | RESPIRATION RATE: 20 BRPM | BODY MASS INDEX: 28.27 KG/M2 | WEIGHT: 183.2 LBS

## 2023-08-08 DIAGNOSIS — L23.7 POISON IVY DERMATITIS: ICD-10-CM

## 2023-08-08 DIAGNOSIS — L03.113 CELLULITIS OF RIGHT UPPER EXTREMITY: Primary | ICD-10-CM

## 2023-08-08 PROCEDURE — 99213 OFFICE O/P EST LOW 20 MIN: CPT

## 2023-08-08 RX ORDER — CEPHALEXIN 500 MG/1
500 CAPSULE ORAL EVERY 12 HOURS SCHEDULED
Qty: 14 CAPSULE | Refills: 0 | Status: SHIPPED | OUTPATIENT
Start: 2023-08-08 | End: 2023-08-15

## 2023-08-08 RX ORDER — CLOBETASOL PROPIONATE 0.5 MG/G
CREAM TOPICAL 2 TIMES DAILY
Qty: 30 G | Refills: 1 | Status: SHIPPED | OUTPATIENT
Start: 2023-08-08

## 2023-08-08 NOTE — PATIENT INSTRUCTIONS
Use the steroid cream twice daily. Tepid luke warm water. No alcohol or bleach on the poison ivy. Apple cider vinegar may help or dermoplast. Avoid scratching. You have been prescribed an antibiotic. Take antibiotic as directed for the full duration. Do not stop the antibiotics just because you are feeling better. Side effects of antibiotics include diarrhea. Eat yogurt or take a probiotic or acidophilus tablet while taking this medication to help prevent diarrhea and replenish good gut bacteria.

## 2023-08-08 NOTE — PROGRESS NOTES
[facility]        NAME: Zafar Morales is a 38 y.o. male  : 1980    MRN: [id number]  DATE: 2023  TIME: 6:09 PM    Assessment and Plan   Cellulitis of right upper extremity [L03.113]  1. Cellulitis of right upper extremity  cephalexin (KEFLEX) 500 mg capsule      2. Poison ivy dermatitis  cephalexin (KEFLEX) 500 mg capsule    clobetasol (TEMOVATE) 0.05 % cream        Rashes consistent with poison ivy dermatitis with surrounding cellulitis on the right side. Patient Instructions     Use the steroid cream twice daily. Tepid luke warm water. No alcohol or bleach on the poison ivy. Apple cider vinegar may help or dermoplast. Avoid scratching. You have been prescribed an antibiotic. Take antibiotic as directed for the full duration. Do not stop the antibiotics just because you are feeling better. Side effects of antibiotics include diarrhea. Eat yogurt or take a probiotic or acidophilus tablet while taking this medication to help prevent diarrhea and replenish good gut bacteria. Follow up with PCP in 3-5 days. Proceed to  ER if symptoms worsen. Chief Complaint     Chief Complaint   Patient presents with   • Rash     Both arms. Painful and itchy. History of Present Illness       Patient is a 74-year-old male who presents to the office today for a rash to his bilateral arms. He states that last  he was helping his dad clean out some brush and did not think anything of it and then Thursday he noticed the rash that has been increasing in itchiness. He states he took a Benadryl without any relief. Has not put anything on it as he was unsure what it was. Review of Systems   Review of Systems   Constitutional: Negative for chills and fever. Skin: Positive for rash. All other systems reviewed and are negative.         Current Medications       Current Outpatient Medications:   •  albuterol (PROVENTIL HFA,VENTOLIN HFA) 90 mcg/act inhaler, Inhale 2 puffs every 6 (six) hours as needed for wheezing, Disp: , Rfl:   •  cephalexin (KEFLEX) 500 mg capsule, Take 1 capsule (500 mg total) by mouth every 12 (twelve) hours for 7 days, Disp: 14 capsule, Rfl: 0  •  clobetasol (TEMOVATE) 0.05 % cream, Apply topically 2 (two) times a day, Disp: 30 g, Rfl: 1  •  ferrous sulfate 325 (65 Fe) mg tablet, Take 325 mg by mouth if needed 3 x wk, Disp: , Rfl:   •  fluticasone (FLOVENT HFA) 220 mcg/act inhaler, Inhale 1 puff 2 (two) times a day, Disp: , Rfl:   •  folic acid (FOLVITE) 1 mg tablet, Take 1 mg by mouth daily, Disp: , Rfl:   •  Multiple Vitamin (multivitamin) tablet, Take 1 tablet by mouth daily, Disp: , Rfl:   •  ondansetron (Zofran ODT) 4 mg disintegrating tablet, Take 1 tablet (4 mg total) by mouth every 6 (six) hours as needed for nausea or vomiting, Disp: 20 tablet, Rfl: 0  •  oxyCODONE-acetaminophen (PERCOCET) 5-325 mg per tablet, Take 1-2 tablets by mouth every 6 (six) hours as needed for moderate pain or severe pain Max Daily Amount: 8 tablets, Disp: 20 tablet, Rfl: 0  •  oxyCODONE-acetaminophen (Percocet) 5-325 mg per tablet, Take 1 tablet by mouth every 4 (four) hours as needed for moderate pain for up to 12 doses Max Daily Amount: 6 tablets (Patient not taking: Reported on 4/25/2023), Disp: 12 tablet, Rfl: 0  •  tamsulosin (FLOMAX) 0.4 mg, Take 1 capsule (0.4 mg total) by mouth daily with dinner (Patient not taking: Reported on 5/3/2023), Disp: 9 capsule, Rfl: 0    Current Allergies     Allergies as of 08/08/2023 - Reviewed 08/08/2023   Allergen Reaction Noted   • Sulfa antibiotics Hives 09/20/2022   • Bee venom Swelling 08/11/2000   • Other Other (See Comments) 08/27/2018            The following portions of the patient's history were reviewed and updated as appropriate: allergies, current medications, past family history, past medical history, past social history, past surgical history and problem list.     Past Medical History:   Diagnosis Date   • Asthma    • Kidney stone 04/18/2023       Past Surgical History:   Procedure Laterality Date   • KNEE SURGERY Left    • RI CYSTO/URETERO W/LITHOTRIPSY &INDWELL STENT INSRT Left 4/26/2023    Procedure: CYSTOSCOPY URETEROSCOPY WITH LITHOTRIPSY HOLMIUM LASER, INSERTION STENT URETERAL;  Surgeon: Angelica King MD;  Location:  MAIN OR;  Service: Urology   • TIBIA FRACTURE SURGERY         Family History   Problem Relation Age of Onset   • Diabetes Mother    • Diabetes Father    • Hypertension Father    • Kidney disease Father    • Stroke Father    • Cancer Maternal Grandfather         Throat cancer   • Diabetes Maternal Grandfather    • Diabetes Maternal Grandmother    • Diabetes Maternal Uncle          Medications have been verified. Objective   /95   Pulse 75   Temp 97.9 °F (36.6 °C)   Resp 20   Wt 83.1 kg (183 lb 3.2 oz)   SpO2 99%   BMI 28.27 kg/m²        Physical Exam     Physical Exam  Vitals and nursing note reviewed. Constitutional:       General: He is not in acute distress. Appearance: Normal appearance. He is normal weight. He is not ill-appearing or toxic-appearing. Cardiovascular:      Rate and Rhythm: Normal rate and regular rhythm. Pulses: Normal pulses. Heart sounds: Normal heart sounds. Pulmonary:      Effort: Pulmonary effort is normal.      Breath sounds: Normal breath sounds. Skin:     Capillary Refill: Capillary refill takes less than 2 seconds. Findings: Rash present. Rash is macular, papular and pustular. Neurological:      General: No focal deficit present. Mental Status: He is alert and oriented to person, place, and time.

## 2023-11-03 ENCOUNTER — APPOINTMENT (OUTPATIENT)
Dept: RADIOLOGY | Facility: CLINIC | Age: 43
End: 2023-11-03
Payer: COMMERCIAL

## 2023-11-03 DIAGNOSIS — M79.644 PAIN IN FINGER OF RIGHT HAND: ICD-10-CM

## 2023-11-03 PROCEDURE — 73140 X-RAY EXAM OF FINGER(S): CPT

## 2023-11-20 ENCOUNTER — OFFICE VISIT (OUTPATIENT)
Dept: URGENT CARE | Facility: CLINIC | Age: 43
End: 2023-11-20
Payer: COMMERCIAL

## 2023-11-20 VITALS
RESPIRATION RATE: 18 BRPM | HEART RATE: 83 BPM | WEIGHT: 179 LBS | DIASTOLIC BLOOD PRESSURE: 73 MMHG | OXYGEN SATURATION: 98 % | BODY MASS INDEX: 27.62 KG/M2 | TEMPERATURE: 98 F | SYSTOLIC BLOOD PRESSURE: 131 MMHG

## 2023-11-20 DIAGNOSIS — J02.0 STREP PHARYNGITIS: Primary | ICD-10-CM

## 2023-11-20 PROCEDURE — 99213 OFFICE O/P EST LOW 20 MIN: CPT

## 2023-11-20 RX ORDER — AMOXICILLIN 500 MG/1
1000 CAPSULE ORAL EVERY 24 HOURS
Qty: 20 CAPSULE | Refills: 0 | Status: SHIPPED | OUTPATIENT
Start: 2023-11-20 | End: 2023-11-30

## 2023-11-20 NOTE — PROGRESS NOTES
[facility]        NAME: Vaughn Doctor. is a 41 y.o. male  : 1980    MRN: [id number]  DATE: 2023  TIME: 4:32 PM    Assessment and Plan   Strep pharyngitis [J02.0]  1. Strep pharyngitis  amoxicillin (AMOXIL) 500 mg capsule        Will treat with amoxicillin. Patient Instructions     Take antibiotics as directed. Take entire duration, do not stop antibiotic just because your symptoms have resolved. Avoid milk products, eat softer foods. Warm salt water rinses. Honey with hot tea. Cool or warm fluid may be soothing. Avoid sharing drinks/utensils. Proper hand hygiene. Dispose of toothbrush after 48 hours of antibiotics. No school for 24 hours. Treat fever with alternating tylenol and motrin. If symptoms worsen proceed to ED. Follow with PCP    Follow up with PCP in 3-5 days. Proceed to  ER if symptoms worsen. Chief Complaint     Chief Complaint   Patient presents with    Sore Throat    Cough    Nasal Congestion         History of Present Illness       Patient is a 81-year-old male presents the office today for sore throat. He states that last week he started with a scratchy sore throat which resolved but this morning he woke up with a severe sore throat chills body aches. Denies any cough, runny nose, nasal congestion. Sore Throat   Pertinent negatives include no congestion or coughing. Cough  Associated symptoms include chills, myalgias and a sore throat. Review of Systems   Review of Systems   Constitutional:  Positive for chills. HENT:  Positive for sore throat. Negative for congestion. Respiratory:  Negative for cough. Musculoskeletal:  Positive for myalgias. All other systems reviewed and are negative.         Current Medications       Current Outpatient Medications:     albuterol (PROVENTIL HFA,VENTOLIN HFA) 90 mcg/act inhaler, Inhale 2 puffs every 6 (six) hours as needed for wheezing, Disp: , Rfl:     amoxicillin (AMOXIL) 500 mg capsule, Take 2 capsules (1,000 mg total) by mouth every 24 hours for 10 days, Disp: 20 capsule, Rfl: 0    ferrous sulfate 325 (65 Fe) mg tablet, Take 325 mg by mouth if needed 3 x wk, Disp: , Rfl:     Multiple Vitamin (multivitamin) tablet, Take 1 tablet by mouth daily, Disp: , Rfl:     ondansetron (Zofran ODT) 4 mg disintegrating tablet, Take 1 tablet (4 mg total) by mouth every 6 (six) hours as needed for nausea or vomiting, Disp: 20 tablet, Rfl: 0    clobetasol (TEMOVATE) 0.05 % cream, Apply topically 2 (two) times a day, Disp: 30 g, Rfl: 1    fluticasone (FLOVENT HFA) 220 mcg/act inhaler, Inhale 1 puff 2 (two) times a day (Patient not taking: Reported on 11/20/2023), Disp: , Rfl:     folic acid (FOLVITE) 1 mg tablet, Take 1 mg by mouth daily (Patient not taking: Reported on 11/20/2023), Disp: , Rfl:     oxyCODONE-acetaminophen (Percocet) 5-325 mg per tablet, Take 1 tablet by mouth every 4 (four) hours as needed for moderate pain for up to 12 doses Max Daily Amount: 6 tablets (Patient not taking: Reported on 4/25/2023), Disp: 12 tablet, Rfl: 0    oxyCODONE-acetaminophen (PERCOCET) 5-325 mg per tablet, Take 1-2 tablets by mouth every 6 (six) hours as needed for moderate pain or severe pain Max Daily Amount: 8 tablets (Patient not taking: Reported on 11/20/2023), Disp: 20 tablet, Rfl: 0    tamsulosin (FLOMAX) 0.4 mg, Take 1 capsule (0.4 mg total) by mouth daily with dinner (Patient not taking: Reported on 5/3/2023), Disp: 9 capsule, Rfl: 0    Current Allergies     Allergies as of 11/20/2023 - Reviewed 11/20/2023   Allergen Reaction Noted    Sulfa antibiotics Hives 09/20/2022    Bee venom Swelling 08/11/2000    Other Other (See Comments) 08/27/2018            The following portions of the patient's history were reviewed and updated as appropriate: allergies, current medications, past family history, past medical history, past social history, past surgical history and problem list.     Past Medical History:   Diagnosis Date Asthma     Kidney stone 04/18/2023       Past Surgical History:   Procedure Laterality Date    KNEE SURGERY Left     [place] CYSTO/URETERO W/LITHOTRIPSY &INDWELL STENT INSRT Left 4/26/2023    Procedure: CYSTOSCOPY URETEROSCOPY WITH LITHOTRIPSY HOLMIUM LASER, INSERTION STENT URETERAL;  Surgeon: Palma Lovelace MD;  Location:  MAIN OR;  Service: Urology    TIBIA FRACTURE SURGERY         Family History   Problem Relation Age of Onset    Diabetes Mother     Diabetes Father     Hypertension Father     Kidney disease Father     Stroke Father     Cancer Maternal Grandfather         Throat cancer    Diabetes Maternal Grandfather     Diabetes Maternal Grandmother     Diabetes Maternal Uncle          Medications have been verified. Objective   /73   Pulse 83   Temp 98 °F (36.7 °C)   Resp 18   Wt 81.2 kg (179 lb)   SpO2 98%   BMI 27.62 kg/m²        Physical Exam     Physical Exam  Vitals and nursing note reviewed. Constitutional:       General: He is not in acute distress. Appearance: He is well-developed and normal weight. He is not ill-appearing or toxic-appearing. HENT:      Mouth/Throat:      Mouth: Mucous membranes are moist.      Pharynx: Posterior oropharyngeal erythema present. Tonsils: 3+ on the right. 3+ on the left. Cardiovascular:      Rate and Rhythm: Normal rate and regular rhythm. Heart sounds: Normal heart sounds. Pulmonary:      Effort: Pulmonary effort is normal.      Breath sounds: Normal breath sounds. Lymphadenopathy:      Cervical: Cervical adenopathy present. Skin:     General: Skin is warm. Capillary Refill: Capillary refill takes less than 2 seconds. Neurological:      Mental Status: He is alert.

## 2023-11-20 NOTE — PATIENT INSTRUCTIONS
Take antibiotics as directed. Take entire duration, do not stop antibiotic just because your symptoms have resolved. Avoid milk products, eat softer foods. Warm salt water rinses. Honey with hot tea. Cool or warm fluid may be soothing. Avoid sharing drinks/utensils. Proper hand hygiene. Dispose of toothbrush after 48 hours of antibiotics. No school for 24 hours. Treat fever with alternating tylenol and motrin. If symptoms worsen proceed to ED.  Follow with PCP

## 2023-12-18 ENCOUNTER — OFFICE VISIT (OUTPATIENT)
Dept: URGENT CARE | Facility: CLINIC | Age: 43
End: 2023-12-18
Payer: COMMERCIAL

## 2023-12-18 VITALS
SYSTOLIC BLOOD PRESSURE: 136 MMHG | DIASTOLIC BLOOD PRESSURE: 79 MMHG | BODY MASS INDEX: 28.33 KG/M2 | WEIGHT: 183.6 LBS | OXYGEN SATURATION: 97 % | TEMPERATURE: 97.6 F | HEART RATE: 93 BPM | RESPIRATION RATE: 20 BRPM

## 2023-12-18 DIAGNOSIS — J02.9 SORE THROAT: Primary | ICD-10-CM

## 2023-12-18 LAB — S PYO AG THROAT QL: NEGATIVE

## 2023-12-18 PROCEDURE — 87070 CULTURE OTHR SPECIMN AEROBIC: CPT

## 2023-12-18 PROCEDURE — 99213 OFFICE O/P EST LOW 20 MIN: CPT

## 2023-12-18 PROCEDURE — 87880 STREP A ASSAY W/OPTIC: CPT

## 2023-12-18 NOTE — LETTER
December 18, 2023     Patient: Dre Billingsley Jr.   YOB: 1980   Date of Visit: 12/18/2023       To Whom It May Concern:    It is my medical opinion that Dre Billingsley may return to work on 12/19/2023 .    If you have any questions or concerns, please don't hesitate to call.         Sincerely,        TIFFANIE Hyde    CC: No Recipients

## 2023-12-18 NOTE — PROGRESS NOTES
[facility]        NAME: Dre Billingsley Jr. is a 43 y.o. male  : 1980    MRN: [id number]  DATE: 2023  TIME: 11:58 AM    Assessment and Plan   Sore throat [J02.9]  1. Sore throat  POCT rapid ANTIGEN strepA    Throat culture    Throat culture        Rapid strep negative.  Will send throat culture.    Patient Instructions     Strep test was negative.  Symptoms are consistent with viral illness.  Use warm salt water gargles honey and hot tea.  As discussed in the office take a COVID test on Wednesday given your symptoms just started last night. Tylenol and motrin for pain or chills.  Follow up with PCP in 3-5 days.  Proceed to  ER if symptoms worsen.    Chief Complaint     Chief Complaint   Patient presents with    Cold Like Symptoms     Sore throat, congestion.           History of Present Illness       Patient is a 43 year old male who presents to the office today for a sore throat and congestion that started last night into this morning. Notes he has had off and on cold symptoms since last visit. Awoke last night with cold sweats. Kids are sick at home with uris. Denies fever. Slight cough.         Review of Systems   Review of Systems   Constitutional:  Positive for chills. Negative for fever.   HENT:  Positive for congestion and sore throat.    Respiratory:  Positive for cough. Negative for shortness of breath and wheezing.    Cardiovascular:  Negative for chest pain, palpitations and leg swelling.   All other systems reviewed and are negative.        Current Medications       Current Outpatient Medications:     albuterol (PROVENTIL HFA,VENTOLIN HFA) 90 mcg/act inhaler, Inhale 2 puffs every 6 (six) hours as needed for wheezing, Disp: , Rfl:     clobetasol (TEMOVATE) 0.05 % cream, Apply topically 2 (two) times a day, Disp: 30 g, Rfl: 1    ferrous sulfate 325 (65 Fe) mg tablet, Take 325 mg by mouth if needed 3 x wk, Disp: , Rfl:     Multiple Vitamin (multivitamin) tablet, Take 1  tablet by mouth daily, Disp: , Rfl:     ondansetron (Zofran ODT) 4 mg disintegrating tablet, Take 1 tablet (4 mg total) by mouth every 6 (six) hours as needed for nausea or vomiting, Disp: 20 tablet, Rfl: 0    Current Allergies     Allergies as of 12/18/2023 - Reviewed 12/18/2023   Allergen Reaction Noted    Sulfa antibiotics Hives 09/20/2022    Bee venom Swelling 08/11/2000    Other Other (See Comments) 08/27/2018            The following portions of the patient's history were reviewed and updated as appropriate: allergies, current medications, past family history, past medical history, past social history, past surgical history and problem list.     Past Medical History:   Diagnosis Date    Asthma     Kidney stone 04/18/2023       Past Surgical History:   Procedure Laterality Date    KNEE SURGERY Left     [place] CYSTO/URETERO W/LITHOTRIPSY &INDWELL STENT INSRT Left 4/26/2023    Procedure: CYSTOSCOPY URETEROSCOPY WITH LITHOTRIPSY HOLMIUM LASER, INSERTION STENT URETERAL;  Surgeon: Frank D'Amico, MD;  Location:  MAIN OR;  Service: Urology    TIBIA FRACTURE SURGERY         Family History   Problem Relation Age of Onset    Diabetes Mother     Diabetes Father     Hypertension Father     Kidney disease Father     Stroke Father     Cancer Maternal Grandfather         Throat cancer    Diabetes Maternal Grandfather     Diabetes Maternal Grandmother     Diabetes Maternal Uncle          Medications have been verified.        Objective   /79   Pulse 93   Temp 97.6 °F (36.4 °C)   Resp 20   Wt 83.3 kg (183 lb 9.6 oz)   SpO2 97%   BMI 28.33 kg/m²        Physical Exam     Physical Exam  Vitals and nursing note reviewed.   Constitutional:       General: He is not in acute distress.     Appearance: Normal appearance. He is normal weight. He is not ill-appearing or toxic-appearing.   HENT:      Right Ear: Tympanic membrane normal.      Left Ear: Tympanic membrane normal.      Mouth/Throat:      Mouth: Mucous membranes are  moist.      Pharynx: Posterior oropharyngeal erythema (posterior pharynx erythema with post nasal drip) present.   Cardiovascular:      Rate and Rhythm: Normal rate and regular rhythm.      Pulses: Normal pulses.      Heart sounds: Normal heart sounds.   Pulmonary:      Effort: Pulmonary effort is normal.      Breath sounds: Normal breath sounds.   Lymphadenopathy:      Cervical: No cervical adenopathy.   Skin:     Capillary Refill: Capillary refill takes less than 2 seconds.   Neurological:      General: No focal deficit present.      Mental Status: He is alert.

## 2023-12-18 NOTE — PATIENT INSTRUCTIONS
Strep test was negative.  Symptoms are consistent with viral illness.  Use warm salt water gargles honey and hot tea.  As discussed in the office take a COVID test on Wednesday given your symptoms just started last night. Tylenol and motrin for pain or chills.

## 2023-12-22 LAB — BACTERIA THROAT CULT: NORMAL

## 2024-01-14 ENCOUNTER — HOSPITAL ENCOUNTER (EMERGENCY)
Facility: HOSPITAL | Age: 44
Discharge: HOME | End: 2024-01-14
Attending: EMERGENCY MEDICINE
Payer: COMMERCIAL

## 2024-01-14 ENCOUNTER — APPOINTMENT (EMERGENCY)
Dept: RADIOLOGY | Facility: HOSPITAL | Age: 44
End: 2024-01-14
Payer: COMMERCIAL

## 2024-01-14 VITALS
WEIGHT: 175 LBS | DIASTOLIC BLOOD PRESSURE: 77 MMHG | TEMPERATURE: 97.7 F | OXYGEN SATURATION: 100 % | SYSTOLIC BLOOD PRESSURE: 134 MMHG | BODY MASS INDEX: 27 KG/M2 | RESPIRATION RATE: 18 BRPM | HEART RATE: 67 BPM

## 2024-01-14 DIAGNOSIS — R07.9 CHEST PAIN, UNSPECIFIED TYPE: Primary | ICD-10-CM

## 2024-01-14 LAB
ALBUMIN SERPL BCP-MCNC: 4.6 G/DL (ref 3.5–5)
ALP SERPL-CCNC: 58 U/L (ref 34–104)
ALT SERPL W P-5'-P-CCNC: 34 U/L (ref 7–52)
ANION GAP SERPL CALCULATED.3IONS-SCNC: 4 MMOL/L
AST SERPL W P-5'-P-CCNC: 24 U/L (ref 13–39)
ATRIAL RATE: 68 BPM
BASOPHILS # BLD AUTO: 0.05 THOUSANDS/ÂΜL (ref 0–0.1)
BASOPHILS NFR BLD AUTO: 1 % (ref 0–1)
BILIRUB SERPL-MCNC: 0.36 MG/DL (ref 0.2–1)
BUN SERPL-MCNC: 19 MG/DL (ref 5–25)
CALCIUM SERPL-MCNC: 9 MG/DL (ref 8.4–10.2)
CARDIAC TROPONIN I PNL SERPL HS: 3 NG/L
CHLORIDE SERPL-SCNC: 105 MMOL/L (ref 96–108)
CO2 SERPL-SCNC: 28 MMOL/L (ref 21–32)
CREAT SERPL-MCNC: 1.06 MG/DL (ref 0.6–1.3)
EOSINOPHIL # BLD AUTO: 0.27 THOUSAND/ÂΜL (ref 0–0.61)
EOSINOPHIL NFR BLD AUTO: 4 % (ref 0–6)
ERYTHROCYTE [DISTWIDTH] IN BLOOD BY AUTOMATED COUNT: 13.2 % (ref 11.6–15.1)
GFR SERPL CREATININE-BSD FRML MDRD: 86 ML/MIN/1.73SQ M
GLUCOSE SERPL-MCNC: 112 MG/DL (ref 65–140)
HCT VFR BLD AUTO: 41.5 % (ref 36.5–49.3)
HGB BLD-MCNC: 13.5 G/DL (ref 12–17)
IMM GRANULOCYTES # BLD AUTO: 0.02 THOUSAND/UL (ref 0–0.2)
IMM GRANULOCYTES NFR BLD AUTO: 0 % (ref 0–2)
LYMPHOCYTES # BLD AUTO: 3.18 THOUSANDS/ÂΜL (ref 0.6–4.47)
LYMPHOCYTES NFR BLD AUTO: 41 % (ref 14–44)
MCH RBC QN AUTO: 29.3 PG (ref 26.8–34.3)
MCHC RBC AUTO-ENTMCNC: 32.5 G/DL (ref 31.4–37.4)
MCV RBC AUTO: 90 FL (ref 82–98)
MONOCYTES # BLD AUTO: 0.82 THOUSAND/ÂΜL (ref 0.17–1.22)
MONOCYTES NFR BLD AUTO: 11 % (ref 4–12)
NEUTROPHILS # BLD AUTO: 3.34 THOUSANDS/ÂΜL (ref 1.85–7.62)
NEUTS SEG NFR BLD AUTO: 43 % (ref 43–75)
NRBC BLD AUTO-RTO: 0 /100 WBCS
P AXIS: 25 DEGREES
PLATELET # BLD AUTO: 278 THOUSANDS/UL (ref 149–390)
PMV BLD AUTO: 9.8 FL (ref 8.9–12.7)
POTASSIUM SERPL-SCNC: 3.4 MMOL/L (ref 3.5–5.3)
PR INTERVAL: 140 MS
PROT SERPL-MCNC: 7.2 G/DL (ref 6.4–8.4)
QRS AXIS: 28 DEGREES
QRSD INTERVAL: 104 MS
QT INTERVAL: 398 MS
QTC INTERVAL: 423 MS
RBC # BLD AUTO: 4.6 MILLION/UL (ref 3.88–5.62)
SODIUM SERPL-SCNC: 137 MMOL/L (ref 135–147)
T WAVE AXIS: 17 DEGREES
VENTRICULAR RATE: 68 BPM
WBC # BLD AUTO: 7.68 THOUSAND/UL (ref 4.31–10.16)

## 2024-01-14 PROCEDURE — 80053 COMPREHEN METABOLIC PANEL: CPT | Performed by: EMERGENCY MEDICINE

## 2024-01-14 PROCEDURE — 99285 EMERGENCY DEPT VISIT HI MDM: CPT

## 2024-01-14 PROCEDURE — 71045 X-RAY EXAM CHEST 1 VIEW: CPT

## 2024-01-14 PROCEDURE — 36415 COLL VENOUS BLD VENIPUNCTURE: CPT | Performed by: EMERGENCY MEDICINE

## 2024-01-14 PROCEDURE — 93010 ELECTROCARDIOGRAM REPORT: CPT | Performed by: INTERNAL MEDICINE

## 2024-01-14 PROCEDURE — 84484 ASSAY OF TROPONIN QUANT: CPT | Performed by: EMERGENCY MEDICINE

## 2024-01-14 PROCEDURE — 99285 EMERGENCY DEPT VISIT HI MDM: CPT | Performed by: EMERGENCY MEDICINE

## 2024-01-14 PROCEDURE — 85025 COMPLETE CBC W/AUTO DIFF WBC: CPT | Performed by: EMERGENCY MEDICINE

## 2024-01-14 PROCEDURE — 93005 ELECTROCARDIOGRAM TRACING: CPT

## 2024-01-14 NOTE — Clinical Note
Dre Billingsley was seen and treated in our emergency department on 1/14/2024.                Diagnosis:     Dre  may return to work on return date.    He may return on this date: 01/15/2024         If you have any questions or concerns, please don't hesitate to call.      Jacob Rodrigues MD    ______________________________           _______________          _______________  Hospital Representative                              Date                                Time

## 2024-01-14 NOTE — ED PROVIDER NOTES
History  Chief Complaint   Patient presents with    Chest Pain     Started with left sided chest pain at 4pm, stated it went away and has since come back. Also feels light headed, cold sweats and shooting pain up his neck, with pain down his left arm.        History provided by:  Medical records and patient  Chest Pain  Pain location:  L chest  Pain quality: aching and dull    Pain radiates to:  Does not radiate  Pain radiates to the back: no    Pain severity:  Mild  Onset quality:  Gradual  Duration:  9 hours  Timing:  Constant  Progression:  Waxing and waning  Chronicity:  New  Context comment:  Patient states he was preparing dinner at 4 PM when he started to have left-sided chest discomfort, has been waxing and waning since associated with some mild lightheadedness.  Relieved by:  Nothing  Worsened by:  Nothing tried  Ineffective treatments:  None tried  Associated symptoms: no abdominal pain, no back pain, no cough, no dizziness, no dysphagia, no fatigue, no fever, no headache, no nausea, no palpitations, no shortness of breath, not vomiting and no weakness    Risk factors: no coronary artery disease and no prior DVT/PE        Prior to Admission Medications   Prescriptions Last Dose Informant Patient Reported? Taking?   Multiple Vitamin (multivitamin) tablet   Yes No   Sig: Take 1 tablet by mouth daily   albuterol (PROVENTIL HFA,VENTOLIN HFA) 90 mcg/act inhaler   Yes No   Sig: Inhale 2 puffs every 6 (six) hours as needed for wheezing   clobetasol (TEMOVATE) 0.05 % cream   No No   Sig: Apply topically 2 (two) times a day   ferrous sulfate 325 (65 Fe) mg tablet  Self Yes No   Sig: Take 325 mg by mouth if needed 3 x wk   ondansetron (Zofran ODT) 4 mg disintegrating tablet   No No   Sig: Take 1 tablet (4 mg total) by mouth every 6 (six) hours as needed for nausea or vomiting      Facility-Administered Medications: None       Past Medical History:   Diagnosis Date    Asthma     Kidney stone 04/18/2023       Past  Surgical History:   Procedure Laterality Date    KNEE SURGERY Left     [place] CYSTO/URETERO W/LITHOTRIPSY &INDWELL STENT INSRT Left 4/26/2023    Procedure: CYSTOSCOPY URETEROSCOPY WITH LITHOTRIPSY HOLMIUM LASER, INSERTION STENT URETERAL;  Surgeon: Frank D'Amico, MD;  Location:  MAIN OR;  Service: Urology    TIBIA FRACTURE SURGERY         Family History   Problem Relation Age of Onset    Diabetes Mother     Diabetes Father     Hypertension Father     Kidney disease Father     Stroke Father     Cancer Maternal Grandfather         Throat cancer    Diabetes Maternal Grandfather     Diabetes Maternal Grandmother     Diabetes Maternal Uncle      I have reviewed and agree with the history as documented.    E-Cigarette/Vaping    E-Cigarette Use Never User      E-Cigarette/Vaping Substances    Nicotine No     THC No     CBD No     Flavoring No     Other No     Unknown No      Social History     Tobacco Use    Smoking status: Never    Smokeless tobacco: Never    Tobacco comments:     Exposed to Second Hand Smoke   Vaping Use    Vaping status: Never Used   Substance Use Topics    Alcohol use: Yes     Alcohol/week: 1.0 standard drink of alcohol     Types: 1 Standard drinks or equivalent per week     Comment: Occasionally    Drug use: Never       Review of Systems   Constitutional:  Negative for appetite change, chills, fatigue and fever.   HENT:  Negative for ear pain, rhinorrhea, sore throat and trouble swallowing.    Eyes:  Negative for pain, discharge and visual disturbance.   Respiratory:  Negative for cough, chest tightness and shortness of breath.    Cardiovascular:  Positive for chest pain. Negative for palpitations.   Gastrointestinal:  Negative for abdominal pain, nausea and vomiting.   Endocrine: Negative for polydipsia, polyphagia and polyuria.   Genitourinary:  Negative for difficulty urinating, dysuria, hematuria and testicular pain.   Musculoskeletal:  Negative for arthralgias and back pain.   Skin:  Negative for  color change and rash.   Allergic/Immunologic: Negative for immunocompromised state.   Neurological:  Positive for light-headedness. Negative for dizziness, seizures, syncope, weakness and headaches.   Hematological:  Negative for adenopathy.   Psychiatric/Behavioral:  Negative for confusion and dysphoric mood.    All other systems reviewed and are negative.      Physical Exam  Physical Exam  Vitals and nursing note reviewed.   Constitutional:       General: He is not in acute distress.     Appearance: Normal appearance. He is well-developed. He is not ill-appearing, toxic-appearing or diaphoretic.   HENT:      Head: Normocephalic and atraumatic.      Nose: Nose normal. No congestion or rhinorrhea.      Mouth/Throat:      Mouth: Mucous membranes are moist.      Pharynx: Oropharynx is clear. No oropharyngeal exudate or posterior oropharyngeal erythema.   Eyes:      General:         Right eye: No discharge.         Left eye: No discharge.   Cardiovascular:      Rate and Rhythm: Normal rate and regular rhythm.      Pulses: Normal pulses.      Heart sounds: Normal heart sounds. No murmur heard.     No gallop.   Pulmonary:      Effort: Pulmonary effort is normal. No respiratory distress.      Breath sounds: Normal breath sounds. No stridor. No wheezing, rhonchi or rales.   Chest:      Chest wall: No tenderness.   Abdominal:      General: Bowel sounds are normal. There is no distension.      Palpations: Abdomen is soft. There is no mass.      Tenderness: There is no abdominal tenderness. There is no right CVA tenderness, left CVA tenderness, guarding or rebound.      Hernia: No hernia is present.   Musculoskeletal:         General: Normal range of motion.      Cervical back: Normal range of motion and neck supple.   Skin:     General: Skin is warm and dry.      Capillary Refill: Capillary refill takes less than 2 seconds.   Neurological:      General: No focal deficit present.      Mental Status: He is alert and oriented  to person, place, and time.      Cranial Nerves: No cranial nerve deficit.      Sensory: No sensory deficit.      Motor: No weakness.      Coordination: Coordination normal.      Gait: Gait normal.      Deep Tendon Reflexes: Reflexes normal.   Psychiatric:         Mood and Affect: Mood normal.         Behavior: Behavior normal.         Thought Content: Thought content normal.         Judgment: Judgment normal.         Vital Signs  ED Triage Vitals [01/14/24 0110]   Temperature Pulse Respirations Blood Pressure SpO2   97.7 °F (36.5 °C) 67 18 134/77 100 %      Temp Source Heart Rate Source Patient Position - Orthostatic VS BP Location FiO2 (%)   Oral Monitor Sitting Left arm --      Pain Score       1           Vitals:    01/14/24 0110   BP: 134/77   Pulse: 67   Patient Position - Orthostatic VS: Sitting         Visual Acuity      ED Medications  Medications - No data to display    Diagnostic Studies  Results Reviewed       Procedure Component Value Units Date/Time    HS Troponin 0hr (reflex protocol) [[id number]]  (Normal) Collected: 01/14/24 0117    Lab Status: Final result Specimen: Blood from Arm, Right Updated: 01/14/24 0156     hs TnI 0hr 3 ng/L     Comprehensive metabolic panel [[id number]]  (Abnormal) Collected: 01/14/24 0117    Lab Status: Final result Specimen: Blood from Arm, Right Updated: 01/14/24 0151     Sodium 137 mmol/L      Potassium 3.4 mmol/L      Chloride 105 mmol/L      CO2 28 mmol/L      ANION GAP 4 mmol/L      BUN 19 mg/dL      Creatinine 1.06 mg/dL      Glucose 112 mg/dL      Calcium 9.0 mg/dL      AST 24 U/L      ALT 34 U/L      Alkaline Phosphatase 58 U/L      Total Protein 7.2 g/dL      Albumin 4.6 g/dL      Total Bilirubin 0.36 mg/dL      eGFR 86 ml/min/1.73sq m     Narrative:      National Kidney [employer] Foundation guidelines for Chronic Kidney Disease (CKD):     Stage 1 with normal or high GFR (GFR > 90 mL/min/1.73 square meters)    Stage 2 Mild CKD (GFR = 60-89 mL/min/1.73 square  meters)    Stage 3A Moderate CKD (GFR = 45-59 mL/min/1.73 square meters)    Stage 3B Moderate CKD (GFR = 30-44 mL/min/1.73 square meters)    Stage 4 Severe CKD (GFR = 15-29 mL/min/1.73 square meters)    Stage 5 End Stage CKD (GFR <15 mL/min/1.73 square meters)  Note: GFR calculation is accurate only with a steady state creatinine    CBC and differential [[id number]] Collected: 01/14/24 0117    Lab Status: Final result Specimen: Blood from Arm, Right Updated: 01/14/24 0125     WBC 7.68 Thousand/uL      RBC 4.60 Million/uL      Hemoglobin 13.5 g/dL      Hematocrit 41.5 %      MCV 90 fL      MCH 29.3 pg      MCHC 32.5 g/dL      RDW 13.2 %      MPV 9.8 fL      Platelets 278 Thousands/uL      nRBC 0 /100 WBCs      Neutrophils Relative 43 %      Immat GRANS % 0 %      Lymphocytes Relative 41 %      Monocytes Relative 11 %      Eosinophils Relative 4 %      Basophils Relative 1 %      Neutrophils Absolute 3.34 Thousands/µL      Immature Grans Absolute 0.02 Thousand/uL      Lymphocytes Absolute 3.18 Thousands/µL      Monocytes Absolute 0.82 Thousand/µL      Eosinophils Absolute 0.27 Thousand/µL      Basophils Absolute 0.05 Thousands/µL                    XR chest 1 view portable    (Results Pending)              Procedures  Procedures         ED Course             HEART Risk Score      Flowsheet Row Most Recent Value   Heart Score Risk Calculator    History 0 Filed at: 01/14/2024 0156   ECG 0 Filed at: 01/14/2024 0156   Age 0 Filed at: 01/14/2024 0156   Risk Factors 0 Filed at: 01/14/2024 0156   Troponin 0 Filed at: 01/14/2024 0156   HEART Score 0 Filed at: 01/14/2024 0156                          SBIRT 20yo+      Flowsheet Row Most Recent Value   Initial Alcohol Screen: US AUDIT-C     1. How often do you have a drink containing alcohol? 0 Filed at: 01/14/2024 0113   2. How many drinks containing alcohol do you have on a typical day you are drinking?  0 Filed at: 01/14/2024 0113   3a. Male UNDER 65: How often do you have  five or more drinks on one occasion? 0 Filed at: 01/14/2024 0113   3b. FEMALE Any Age, or MALE 65+: How often do you have 4 or more drinks on one occassion? 0 Filed at: 01/14/2024 0113   Audit-C Score 0 Filed at: 01/14/2024 0113                      Medical Decision Making  0117: Patient appears well, vital signs reviewed.  Placed on the monitor.  EKG nonischemic.  Plan to complete basic labs including cardiac enzymes.  Check chest x-ray.    0157: Chest x-ray and labs reviewed.  Patient has remained stable throughout ED course.  Stable for discharge.    Amount and/or Complexity of Data Reviewed  Labs: ordered.  Radiology: ordered.     Details: Chest x-ray no acute pathology  ECG/medicine tests: ordered and independent interpretation performed.     Details: Normal sinus rhythm 68 bpm no acute ischemia             Disposition  Final diagnoses:   Chest pain, unspecified type     Time reflects when diagnosis was documented in both MDM as applicable and the Disposition within this note       Time User Action Codes Description Comment    1/14/2024  1:56 AM Jacob Rodrigues Add [R07.9] Chest pain, unspecified type           ED Disposition       ED Disposition   Discharge    Condition   Stable    Date/Time   Sun Jan 14, 2024 0156    Comment   Dre Billingsley Jr. discharge to home/self care.                   Follow-up Information       Follow up With Specialties Details Why Contact Info    Taiwo Viera, DO Internal Medicine Schedule an appointment as soon as possible for a visit   [address] [place] [id number]  [phone]              Patient's Medications   Discharge Prescriptions    No medications on file       No discharge procedures on file.    PDMP Review         Value Time User    PDMP Reviewed  Yes 4/22/2023  1:48 PM Valentin Dai MD            ED Provider  Electronically Signed by             Jacob Rodrigues MD  01/14/24 0157

## 2024-03-09 ENCOUNTER — OFFICE VISIT (OUTPATIENT)
Dept: URGENT CARE | Facility: MEDICAL CENTER | Age: 44
End: 2024-03-09
Payer: COMMERCIAL

## 2024-03-09 VITALS
WEIGHT: 175 LBS | OXYGEN SATURATION: 97 % | SYSTOLIC BLOOD PRESSURE: 117 MMHG | TEMPERATURE: 97.5 F | RESPIRATION RATE: 18 BRPM | HEART RATE: 79 BPM | DIASTOLIC BLOOD PRESSURE: 77 MMHG | BODY MASS INDEX: 26.52 KG/M2 | HEIGHT: 68 IN

## 2024-03-09 DIAGNOSIS — J30.9 ALLERGIC RHINITIS, UNSPECIFIED SEASONALITY, UNSPECIFIED TRIGGER: ICD-10-CM

## 2024-03-09 DIAGNOSIS — J02.9 ACUTE PHARYNGITIS, UNSPECIFIED ETIOLOGY: Primary | ICD-10-CM

## 2024-03-09 LAB — S PYO AG THROAT QL: NEGATIVE

## 2024-03-09 PROCEDURE — 87070 CULTURE OTHR SPECIMN AEROBIC: CPT | Performed by: STUDENT IN AN ORGANIZED HEALTH CARE EDUCATION/TRAINING PROGRAM

## 2024-03-09 PROCEDURE — 99213 OFFICE O/P EST LOW 20 MIN: CPT | Performed by: STUDENT IN AN ORGANIZED HEALTH CARE EDUCATION/TRAINING PROGRAM

## 2024-03-09 PROCEDURE — 87880 STREP A ASSAY W/OPTIC: CPT | Performed by: STUDENT IN AN ORGANIZED HEALTH CARE EDUCATION/TRAINING PROGRAM

## 2024-03-09 NOTE — PATIENT INSTRUCTIONS
- return to clinic if stiff neck, drooling, headache, new fever, unable to swallow  - use lozenges, ice, soft foods, or popsicles  to soothe your throat.  - if unable to eat solid food, keep drinking fluids to avoid dehydration   - Gargle with salt water.  Mix ¼ teaspoon salt in a glass of warm water and gargle. This may help reduce swelling in your throat.

## 2024-03-09 NOTE — PROGRESS NOTES
[facility]        NAME: Dre Billingsley Jr. is a 43 y.o. male  : 1980    MRN: [id number]    Assessment and Plan   Acute pharyngitis, unspecified etiology [J02.9]  1. Acute pharyngitis, unspecified etiology  POCT rapid strepA    Throat culture      2. Allergic rhinitis, unspecified seasonality, unspecified trigger            Results for orders placed or performed in visit on 24   POCT rapid strepA   Result Value Ref Range     RAPID STREP A Negative Negative     Some suspicion for strep due to cervical adenopathy along with close exposure.  Rapid strep negative, will send for throat culture to confirm.  Discussed symptomatic and supportive measures for management.    Also suspect allergic contributing to symptoms so recommended restarting allergy regimen.    Patient Instructions     See wrap up for details  Follow up with PCP in 3-5 days.  Proceed to  ER if symptoms worsen.    Chief Complaint     Chief Complaint   Patient presents with    Sore Throat     Sore throat started last night was exposed to strep          History of Present Illness     HPI    Reports osnet of symptoms yesterday with sore throat, mild pain with swallowing, mildly productive cough, congestion  Denies ear pain, nausea, vomiting, rhinorrhea  Dtr has strep  Tried nyquil and dayquil with minimal relief   No hx of tonsillectomy   Hx of seasonal allergies, not taking anything at this time     Review of Systems   Review of Systems   Constitutional:  Negative for chills and fever.   HENT:  Positive for congestion, sore throat and trouble swallowing. Negative for ear pain and rhinorrhea.    Eyes:  Negative for pain and visual disturbance.   Respiratory:  Positive for cough. Negative for shortness of breath.    Cardiovascular:  Negative for chest pain and palpitations.   Gastrointestinal:  Negative for abdominal pain and vomiting.   Genitourinary:  Negative for dysuria and hematuria.   Musculoskeletal:  Negative for arthralgias  and back pain.   Skin:  Negative for color change and rash.   Neurological:  Negative for seizures and syncope.   All other systems reviewed and are negative.    Current Medications       Current Outpatient Medications:     albuterol (PROVENTIL HFA,VENTOLIN HFA) 90 mcg/act inhaler, Inhale 2 puffs every 6 (six) hours as needed for wheezing, Disp: , Rfl:     clobetasol (TEMOVATE) 0.05 % cream, Apply topically 2 (two) times a day, Disp: 30 g, Rfl: 1    ferrous sulfate 325 (65 Fe) mg tablet, Take 325 mg by mouth if needed 3 x wk, Disp: , Rfl:     Multiple Vitamin (multivitamin) tablet, Take 1 tablet by mouth daily, Disp: , Rfl:     ondansetron (Zofran ODT) 4 mg disintegrating tablet, Take 1 tablet (4 mg total) by mouth every 6 (six) hours as needed for nausea or vomiting, Disp: 20 tablet, Rfl: 0    Current Allergies     Allergies as of 03/09/2024 - Reviewed 03/09/2024   Allergen Reaction Noted    Sulfa antibiotics Hives 09/20/2022    Bee venom Swelling 08/11/2000    Other Other (See Comments) 08/27/2018            The following portions of the patient's history were reviewed and updated as appropriate: allergies, current medications, past family history, past medical history, past social history, past surgical history and problem list.     Past Medical History:   Diagnosis Date    Asthma     Kidney stone 04/18/2023       Past Surgical History:   Procedure Laterality Date    KNEE SURGERY Left     [place] CYSTO/URETERO W/LITHOTRIPSY &INDWELL STENT INSRT Left 4/26/2023    Procedure: CYSTOSCOPY URETEROSCOPY WITH LITHOTRIPSY HOLMIUM LASER, INSERTION STENT URETERAL;  Surgeon: Frank D'Amico, MD;  Location:  MAIN OR;  Service: Urology    TIBIA FRACTURE SURGERY         Family History   Problem Relation Age of Onset    Diabetes Mother     Diabetes Father     Hypertension Father     Kidney disease Father     Stroke Father     Cancer Maternal Grandfather         Throat cancer    Diabetes Maternal Grandfather     Diabetes Maternal  "Grandmother     Diabetes Maternal Uncle          Medications have been verified.        Objective   /77   Pulse 79   Temp 97.5 °F (36.4 °C)   Resp 18   Ht 5' 7.5\" (1.715 m)   Wt 79.4 kg (175 lb)   SpO2 97%   BMI 27.00 kg/m²        Physical Exam     Physical Exam  Constitutional:       General: He is not in acute distress.     Appearance: He is normal weight.   HENT:      Head: Normocephalic and atraumatic.      Right Ear: Ear canal normal. A middle ear effusion is present. Tympanic membrane is not erythematous.      Left Ear: Ear canal normal. A middle ear effusion is present. Tympanic membrane is not erythematous.      Nose: Congestion present.      Mouth/Throat:      Mouth: Mucous membranes are moist.      Pharynx: Oropharynx is clear. Posterior oropharyngeal erythema present. No oropharyngeal exudate.      Tonsils: No tonsillar exudate or tonsillar abscesses. 2+ on the right. 2+ on the left.   Eyes:      Conjunctiva/sclera: Conjunctivae normal.   Cardiovascular:      Rate and Rhythm: Normal rate and regular rhythm.   Pulmonary:      Effort: Pulmonary effort is normal.      Breath sounds: Normal breath sounds.   Musculoskeletal:      Cervical back: Normal range of motion.   Lymphadenopathy:      Cervical: Cervical adenopathy present.   Neurological:      Mental Status: He is alert.         "

## 2024-03-10 LAB — BACTERIA THROAT CULT: NORMAL

## 2024-03-11 LAB — BACTERIA THROAT CULT: NORMAL

## 2024-03-15 ENCOUNTER — OFFICE VISIT (OUTPATIENT)
Dept: URGENT CARE | Facility: CLINIC | Age: 44
End: 2024-03-15
Payer: COMMERCIAL

## 2024-03-15 VITALS
OXYGEN SATURATION: 98 % | DIASTOLIC BLOOD PRESSURE: 67 MMHG | WEIGHT: 182.4 LBS | BODY MASS INDEX: 28.15 KG/M2 | RESPIRATION RATE: 20 BRPM | SYSTOLIC BLOOD PRESSURE: 126 MMHG | HEART RATE: 87 BPM | TEMPERATURE: 97.8 F

## 2024-03-15 DIAGNOSIS — J01.90 ACUTE SINUSITIS, RECURRENCE NOT SPECIFIED, UNSPECIFIED LOCATION: Primary | ICD-10-CM

## 2024-03-15 PROCEDURE — 99213 OFFICE O/P EST LOW 20 MIN: CPT | Performed by: PHYSICIAN ASSISTANT

## 2024-03-15 RX ORDER — AMOXICILLIN AND CLAVULANATE POTASSIUM 875; 125 MG/1; MG/1
1 TABLET, FILM COATED ORAL EVERY 12 HOURS SCHEDULED
Qty: 14 TABLET | Refills: 0 | Status: SHIPPED | OUTPATIENT
Start: 2024-03-15 | End: 2024-03-22

## 2024-03-15 NOTE — PROGRESS NOTES
[facility]        NAME: Dre Billingsley Jr. is a 43 y.o. male  : 1980    MRN: [id number]  DATE: March 15, 2024  TIME: 1:36 PM    Assessment and Plan   Acute sinusitis, recurrence not specified, unspecified location [J01.90]  1. Acute sinusitis, recurrence not specified, unspecified location  amoxicillin-clavulanate (AUGMENTIN) 875-125 mg per tablet        Discussed taking Augmentin on /Monday if symptoms do not improve.     Patient Instructions     Augmentin as prescribed (take with food)  Flonase  Vitamin D3 2000 IU daily  Vitamin C 1000mg twice per day  Multivitamin daily  Some studies suggest that Zinc 12.5-15mg every 2 hours while awake x 5 days may shorten the duration cold symptoms by 1-2 days.   Fluids and rest  Nasal saline spray; Afrin if severe congestion (do not use for more than 3 days)  Over the counter decongestant  Tylenol/Ibuprofen for pain/fever  Salt water gargles and chloraseptic spray  Throat Coat Tea  Warm compresses over sinuses  Steam treatment (utilize proper safety precautions when in contact with hot water/steam)  Follow up with PCP in 3-5 days.  Proceed to  ER if symptoms worsen.    If tests have been performed at [facility], our office will contact you with results if changes need to be made to the care plan discussed with you at the visit.  [facility]    Eat yogurt with live and active cultures and/or take a probiotic at least 3 hours before or after antibiotic dose. Monitor stool for diarrhea and/or blood. If this occurs, contact primary care doctor ASAP.       Chief Complaint     Chief Complaint   Patient presents with    Cold Like Symptoms     Sore throat, congestion, headache.  Worse in the AM and PM.  Neg strep last Saturday.           History of Present Illness       Negative rapid strep test 6 days ago.    URI   This is a new problem. The current episode started in the past 7 days. The problem has been gradually  worsening. There has been no fever. Associated symptoms include congestion, coughing (productive), headaches, rhinorrhea and a sore throat. Pertinent negatives include no ear pain, sinus pain or sneezing. Treatments tried: DayQuil.       Review of Systems   Review of Systems   Constitutional:  Negative for chills, fatigue and fever.   HENT:  Positive for congestion, postnasal drip, rhinorrhea, sinus pressure and sore throat. Negative for ear discharge, ear pain, hearing loss, sinus pain and sneezing.    Respiratory:  Positive for cough (productive).    Neurological:  Positive for headaches.         Current Medications       Current Outpatient Medications:     albuterol (PROVENTIL HFA,VENTOLIN HFA) 90 mcg/act inhaler, Inhale 2 puffs every 6 (six) hours as needed for wheezing, Disp: , Rfl:     amoxicillin-clavulanate (AUGMENTIN) 875-125 mg per tablet, Take 1 tablet by mouth every 12 (twelve) hours for 7 days, Disp: 14 tablet, Rfl: 0    clobetasol (TEMOVATE) 0.05 % cream, Apply topically 2 (two) times a day, Disp: 30 g, Rfl: 1    ferrous sulfate 325 (65 Fe) mg tablet, Take 325 mg by mouth if needed 3 x wk, Disp: , Rfl:     Multiple Vitamin (multivitamin) tablet, Take 1 tablet by mouth daily, Disp: , Rfl:     ondansetron (Zofran ODT) 4 mg disintegrating tablet, Take 1 tablet (4 mg total) by mouth every 6 (six) hours as needed for nausea or vomiting, Disp: 20 tablet, Rfl: 0    Current Allergies     Allergies as of 03/15/2024 - Reviewed 03/15/2024   Allergen Reaction Noted    Sulfa antibiotics Hives 09/20/2022    Bee venom Swelling 08/11/2000    Other Other (See Comments) 08/27/2018            The following portions of the patient's history were reviewed and updated as appropriate: allergies, current medications, past family history, past medical history, past social history, past surgical history and problem list.     Past Medical History:   Diagnosis Date    Asthma     Kidney stone 04/18/2023       Past Surgical  History:   Procedure Laterality Date    KNEE SURGERY Left     DE CYSTO/URETERO W/LITHOTRIPSY &INDWELL STENT INSRT Left 4/26/2023    Procedure: CYSTOSCOPY URETEROSCOPY WITH LITHOTRIPSY HOLMIUM LASER, INSERTION STENT URETERAL;  Surgeon: Frank D'Amico, MD;  Location:  MAIN OR;  Service: Urology    TIBIA FRACTURE SURGERY         Family History   Problem Relation Age of Onset    Diabetes Mother     Diabetes Father     Hypertension Father     Kidney disease Father     Stroke Father     Cancer Maternal Grandfather         Throat cancer    Diabetes Maternal Grandfather     Diabetes Maternal Grandmother     Diabetes Maternal Uncle          Medications have been verified.        Objective   /67   Pulse 87   Temp 97.8 °F (36.6 °C)   Resp 20   Wt 82.7 kg (182 lb 6.4 oz)   SpO2 98%   BMI 28.15 kg/m²   No LMP for male patient.       Physical Exam     Physical Exam  Vitals reviewed.   Constitutional:       General: He is not in acute distress.     Appearance: He is well-developed. He is not diaphoretic.   HENT:      Head: Normocephalic.      Right Ear: Tympanic membrane, ear canal and external ear normal.      Left Ear: Tympanic membrane, ear canal and external ear normal.      Nose: Nose normal.      Mouth/Throat:      Pharynx: No oropharyngeal exudate or posterior oropharyngeal erythema.   Eyes:      Conjunctiva/sclera: Conjunctivae normal.   Cardiovascular:      Rate and Rhythm: Normal rate and regular rhythm.      Heart sounds: Normal heart sounds. No murmur heard.     No friction rub. No gallop.   Pulmonary:      Effort: Pulmonary effort is normal. No respiratory distress.      Breath sounds: Normal breath sounds. No wheezing, rhonchi or rales.   Lymphadenopathy:      Cervical: No cervical adenopathy.   Skin:     General: Skin is warm.   Neurological:      Mental Status: He is alert and oriented to person, place, and time.   Psychiatric:         Behavior: Behavior normal.         Thought Content: Thought content  normal.         Judgment: Judgment normal.

## 2024-03-15 NOTE — PATIENT INSTRUCTIONS
Augmentin as prescribed (take with food)  Flonase  Vitamin D3 2000 IU daily  Vitamin C 1000mg twice per day  Multivitamin daily  Some studies suggest that Zinc 12.5-15mg every 2 hours while awake x 5 days may shorten the duration cold symptoms by 1-2 days.   Fluids and rest  Nasal saline spray; Afrin if severe congestion (do not use for more than 3 days)  Over the counter decongestant  Tylenol/Ibuprofen for pain/fever  Salt water gargles and chloraseptic spray  Throat Coat Tea  Warm compresses over sinuses  Steam treatment (utilize proper safety precautions when in contact with hot water/steam)  Follow up with PCP in 3-5 days.  Proceed to  ER if symptoms worsen.    If tests have been performed at [facility], our office will contact you with results if changes need to be made to the care plan discussed with you at the visit.  You can review your full results on [facility] MyChart.    Eat yogurt with live and active cultures and/or take a probiotic at least 3 hours before or after antibiotic dose. Monitor stool for diarrhea and/or blood. If this occurs, contact primary care doctor ASAP.

## 2024-03-15 NOTE — LETTER
March 15, 2024     Patient: Dre Billingsley Jr.   YOB: 1980   Date of Visit: 3/15/2024       To Whom It May Concern:    It is my medical opinion that Dre Billingsley may return once symptoms have improved and has remained fever free for 24 hours without fever reducing medications.     If you have any questions or concerns, please don't hesitate to call.         Sincerely,        Maria Isabel Basurto PA-C    CC: No Recipients

## 2024-05-07 ENCOUNTER — APPOINTMENT (OUTPATIENT)
Dept: RADIOLOGY | Facility: CLINIC | Age: 44
End: 2024-05-07
Payer: COMMERCIAL

## 2024-05-07 DIAGNOSIS — M25.561 PAIN OF RIGHT PATELLA: ICD-10-CM

## 2024-05-07 PROCEDURE — 73564 X-RAY EXAM KNEE 4 OR MORE: CPT

## 2024-11-27 ENCOUNTER — OFFICE VISIT (OUTPATIENT)
Dept: URGENT CARE | Facility: CLINIC | Age: 44
End: 2024-11-27
Payer: COMMERCIAL

## 2024-11-27 VITALS
HEART RATE: 86 BPM | TEMPERATURE: 98 F | SYSTOLIC BLOOD PRESSURE: 120 MMHG | OXYGEN SATURATION: 98 % | DIASTOLIC BLOOD PRESSURE: 70 MMHG | RESPIRATION RATE: 18 BRPM

## 2024-11-27 DIAGNOSIS — J02.9 VIRAL PHARYNGITIS: Primary | ICD-10-CM

## 2024-11-27 LAB — S PYO AG THROAT QL: NEGATIVE

## 2024-11-27 PROCEDURE — S9083 URGENT CARE CENTER GLOBAL: HCPCS

## 2024-11-27 PROCEDURE — 87880 STREP A ASSAY W/OPTIC: CPT

## 2024-11-27 PROCEDURE — G0382 LEV 3 HOSP TYPE B ED VISIT: HCPCS

## 2024-11-27 PROCEDURE — 87070 CULTURE OTHR SPECIMN AEROBIC: CPT

## 2024-11-27 NOTE — PATIENT INSTRUCTIONS
"Strep negative.  Pt appears to have a viral upper respiratory infection. Antibiotics are contraindicated at this time and would not help you feel better faster.     Although the symptoms are troublesome, usually the patient's body is able to recover from a viral infection on an average time of 7-10 days.  Fever, if any, typically resolves after 3-5 days.  If patient has sore throat, typically this resolves within 3-5 days.  Any nasal congestion, runny nose, post nasal drip typically begin to  improve after 7-10 days.  Any cough may linger over a couple weeks.  Please note that having a cough is not necessarily a bad thing.  It often times is part of our body's protective mechanism to help keep our airways clear.      Please note that yellow mucous doesn't necessarily mean a \"bacterial\" infection.  Yellow mucous doesn't automatically mean that an antibiotic is needed.  It is not unusual for mucus to become more discolored in the days after the start of an upper respiratory infection.  Often times this is due to mucous that has thickened  with white blood cells that have flooded the mucosa to try and fight the viral infection.      Ear Pain may occur when the eustachian tubes become blocked with mucous or swollen due to acute inflammation from illness. May give Ibuprofen or Tylenol as needed for comfort.  May also use warm compress against ear for comfort.  If ear ache is persisting and not improving over 2-3 days or if there is any gross drainage coming from ear, please seek further evaluation.      Natural remedies to help provide comfort for cough/ cold symptoms include: one teaspoon of honey (only in infants over 1 year of age), increased vitamin C (oranges, prema, etc.), ginger, and drinking plenty of fluids. Vaporizer by bedside.    If you should have prolonged symptoms (Greater than 10 days), worsening symptoms, or any new symptoms please seek further medical attention.    If you would be having difficulty " breathing, seek further evaluation by calling 911 or proceeding to ER for further evaluation.

## 2024-11-27 NOTE — PROGRESS NOTES
"  [facility]        NAME: Dre Billingsley Jr. is a 44 y.o. male  : 1980    MRN: [id number]  DATE: 2024  TIME: 11:59 AM    Assessment and Plan   Viral pharyngitis [J02.9]  1. Viral pharyngitis  POCT rapid ANTIGEN strepA    Throat culture    Throat culture        Rapid strep negative  Will send throat culture.  Symptoms appear viral at this time and recommend supportive care    Patient Instructions   Strep negative.  Pt appears to have a viral upper respiratory infection. Antibiotics are contraindicated at this time and would not help you feel better faster.     Although the symptoms are troublesome, usually the patient's body is able to recover from a viral infection on an average time of 7-10 days.  Fever, if any, typically resolves after 3-5 days.  If patient has sore throat, typically this resolves within 3-5 days.  Any nasal congestion, runny nose, post nasal drip typically begin to  improve after 7-10 days.  Any cough may linger over a couple weeks.  Please note that having a cough is not necessarily a bad thing.  It often times is part of our body's protective mechanism to help keep our airways clear.      Please note that yellow mucous doesn't necessarily mean a \"bacterial\" infection.  Yellow mucous doesn't automatically mean that an antibiotic is needed.  It is not unusual for mucus to become more discolored in the days after the start of an upper respiratory infection.  Often times this is due to mucous that has thickened  with white blood cells that have flooded the mucosa to try and fight the viral infection.      Ear Pain may occur when the eustachian tubes become blocked with mucous or swollen due to acute inflammation from illness. May give Ibuprofen or Tylenol as needed for comfort.  May also use warm compress against ear for comfort.  If ear ache is persisting and not improving over 2-3 days or if there is any gross drainage coming from ear, please seek further " evaluation.      Natural remedies to help provide comfort for cough/ cold symptoms include: one teaspoon of honey (only in infants over 1 year of age), increased vitamin C (oranges, prema, etc.), ginger, and drinking plenty of fluids. Vaporizer by bedside.    If you should have prolonged symptoms (Greater than 10 days), worsening symptoms, or any new symptoms please seek further medical attention.    If you would be having difficulty breathing, seek further evaluation by calling 911 or proceeding to ER for further evaluation.     Follow up with PCP in 3-5 days.  Proceed to  ER if symptoms worsen.    Chief Complaint     Chief Complaint   Patient presents with    Cough     Sore throat nasal congestion and productive cough for green mucous.  Started yesterday,          History of Present Illness       Patient is a 44 y.o male who presents to the office today for a cough and congestion that started yesterday. Notes everyone sick at home and at work.         Review of Systems   Review of Systems   HENT:  Positive for congestion and sore throat.    Respiratory:  Positive for cough.    All other systems reviewed and are negative.        Current Medications       Current Outpatient Medications:     albuterol (PROVENTIL HFA,VENTOLIN HFA) 90 mcg/act inhaler, Inhale 2 puffs every 6 (six) hours as needed for wheezing, Disp: , Rfl:     ferrous sulfate 325 (65 Fe) mg tablet, Take 325 mg by mouth if needed 3 x wk, Disp: , Rfl:     clobetasol (TEMOVATE) 0.05 % cream, Apply topically 2 (two) times a day (Patient not taking: Reported on 11/27/2024), Disp: 30 g, Rfl: 1    Multiple Vitamin (multivitamin) tablet, Take 1 tablet by mouth daily (Patient not taking: Reported on 11/27/2024), Disp: , Rfl:     ondansetron (Zofran ODT) 4 mg disintegrating tablet, Take 1 tablet (4 mg total) by mouth every 6 (six) hours as needed for nausea or vomiting (Patient not taking: Reported on 11/27/2024), Disp: 20 tablet, Rfl: 0    Current Allergies      Allergies as of 11/27/2024 - Reviewed 11/27/2024   Allergen Reaction Noted    Sulfa antibiotics Hives 09/20/2022    Bee venom Swelling 08/11/2000    Other Other (See Comments) 08/27/2018            The following portions of the patient's history were reviewed and updated as appropriate: allergies, current medications, past family history, past medical history, past social history, past surgical history and problem list.     Past Medical History:   Diagnosis Date    Asthma     Kidney stone 04/18/2023       Past Surgical History:   Procedure Laterality Date    KNEE SURGERY Left     DC CYSTO/URETERO W/LITHOTRIPSY &INDWELL STENT INSRT Left 4/26/2023    Procedure: CYSTOSCOPY URETEROSCOPY WITH LITHOTRIPSY HOLMIUM LASER, INSERTION STENT URETERAL;  Surgeon: Frank D'Amico, MD;  Location:  MAIN OR;  Service: Urology    TIBIA FRACTURE SURGERY         Family History   Problem Relation Age of Onset    Diabetes Mother     Diabetes Father     Hypertension Father     Kidney disease Father     Stroke Father     Cancer Maternal Grandfather         Throat cancer    Diabetes Maternal Grandfather     Diabetes Maternal Grandmother     Diabetes Maternal Uncle          Medications have been verified.        Objective   /70 (BP Location: Left arm)   Pulse 86   Temp 98 °F (36.7 °C) (Skin)   Resp 18   SpO2 98%        Physical Exam     Physical Exam  Vitals and nursing note reviewed.   Constitutional:       Appearance: Normal appearance. He is normal weight.   HENT:      Right Ear: Tympanic membrane normal.      Left Ear: Tympanic membrane normal.      Nose: Congestion present.      Mouth/Throat:      Mouth: Mucous membranes are moist.      Pharynx: Posterior oropharyngeal erythema present.      Comments: PND  Cardiovascular:      Rate and Rhythm: Normal rate and regular rhythm.      Pulses: Normal pulses.      Heart sounds: Normal heart sounds.   Pulmonary:      Effort: Pulmonary effort is normal.      Breath sounds: Normal  breath sounds.   Skin:     General: Skin is warm.      Capillary Refill: Capillary refill takes less than 2 seconds.   Neurological:      Mental Status: He is alert.

## 2024-11-29 ENCOUNTER — RESULTS FOLLOW-UP (OUTPATIENT)
Dept: URGENT CARE | Facility: CLINIC | Age: 44
End: 2024-11-29

## 2024-11-29 LAB — BACTERIA THROAT CULT: NORMAL

## 2024-12-01 ENCOUNTER — OFFICE VISIT (OUTPATIENT)
Dept: URGENT CARE | Facility: MEDICAL CENTER | Age: 44
End: 2024-12-01
Payer: COMMERCIAL

## 2024-12-01 VITALS
OXYGEN SATURATION: 98 % | HEART RATE: 90 BPM | WEIGHT: 176 LBS | BODY MASS INDEX: 27.16 KG/M2 | TEMPERATURE: 97.1 F | RESPIRATION RATE: 20 BRPM | DIASTOLIC BLOOD PRESSURE: 84 MMHG | SYSTOLIC BLOOD PRESSURE: 120 MMHG

## 2024-12-01 DIAGNOSIS — R05.1 ACUTE COUGH: Primary | ICD-10-CM

## 2024-12-01 PROBLEM — D50.9 IRON DEFICIENCY ANEMIA: Status: ACTIVE | Noted: 2022-10-05

## 2024-12-01 PROBLEM — F31.9 BIPOLAR 1 DISORDER (HCC): Status: ACTIVE | Noted: 2024-05-09

## 2024-12-01 PROCEDURE — G0382 LEV 3 HOSP TYPE B ED VISIT: HCPCS

## 2024-12-01 PROCEDURE — S9083 URGENT CARE CENTER GLOBAL: HCPCS

## 2024-12-01 RX ORDER — METHYLPREDNISOLONE 4 MG/1
TABLET ORAL
Qty: 1 EACH | Refills: 0 | Status: SHIPPED | OUTPATIENT
Start: 2024-12-01

## 2024-12-01 NOTE — PATIENT INSTRUCTIONS
You may take over the counter Tylenol (Acetaminophen) and/or Motrin (Ibuprofen) as needed, as directed on packaging. Be sure to get plenty of rest, and drinking fluids to remain hydrated.     Please follow up with your primary provider in the next several days. Should you have any worsening of symptoms, or lack of improvement please be re-evaluated. If needed for significant concerns, consider 911 or ER evaluation.

## 2024-12-01 NOTE — PROGRESS NOTES
[facility]        NAME: Dre Billingsley Jr. is a 44 y.o. male  : 1980    MRN: [id number]  DATE: 2024  TIME: 2:55 PM    Assessment and Plan   Acute cough [R05.1]  1. Acute cough  methylPREDNISolone 4 MG tablet therapy pack            Patient Instructions       Follow up with PCP in 3-5 days.  Proceed to  ER if symptoms worsen.    If tests are performed, our office will contact you with results only if changes need to made to the care plan discussed with you at the visit. You can review your full results on [facility].    Chief Complaint     Chief Complaint   Patient presents with    Cough     Cough which started Tuesday. Along with sore throat. Seen at [facility] Strep negative. Sore throat gone and cough persists.         History of Present Illness       Seen on Wednesday in [place]. Negative strep. Last night started with SOB worsening so started using the nebulizer and not just the inhaler like he was previously, last used nebulizer about 2-3 hrs ago. No fevers or chills. Cough is productive at times. History of asthma, non-smoker.         Review of Systems   Review of Systems   Constitutional:  Negative for appetite change, chills, fatigue and fever.   HENT:  Positive for congestion, postnasal drip and sore throat. Negative for ear pain, rhinorrhea, sinus pressure, sinus pain and trouble swallowing.    Respiratory:  Positive for cough and chest tightness. Negative for shortness of breath.    Cardiovascular:  Negative for chest pain and palpitations.   Gastrointestinal:  Negative for abdominal pain, constipation, diarrhea, nausea and vomiting.   Musculoskeletal:  Negative for arthralgias and back pain.   Skin:  Negative for color change and rash.   Neurological:  Positive for light-headedness. Negative for dizziness and headaches.   All other systems reviewed and are negative.        Current Medications       Current Outpatient Medications:     albuterol  (PROVENTIL HFA,VENTOLIN HFA) 90 mcg/act inhaler, Inhale 2 puffs every 6 (six) hours as needed for wheezing, Disp: , Rfl:     ferrous sulfate 325 (65 Fe) mg tablet, Take 325 mg by mouth if needed 3 x wk, Disp: , Rfl:     methylPREDNISolone 4 MG tablet therapy pack, Use as directed on package, Disp: 1 each, Rfl: 0    clobetasol (TEMOVATE) 0.05 % cream, Apply topically 2 (two) times a day (Patient not taking: Reported on 11/27/2024), Disp: 30 g, Rfl: 1    Multiple Vitamin (multivitamin) tablet, Take 1 tablet by mouth daily (Patient not taking: Reported on 11/27/2024), Disp: , Rfl:     ondansetron (Zofran ODT) 4 mg disintegrating tablet, Take 1 tablet (4 mg total) by mouth every 6 (six) hours as needed for nausea or vomiting (Patient not taking: Reported on 11/27/2024), Disp: 20 tablet, Rfl: 0    Current Allergies     Allergies as of 12/01/2024 - Reviewed 12/01/2024   Allergen Reaction Noted    Sulfa antibiotics Hives 09/20/2022    Bee venom Swelling 08/11/2000    Other Other (See Comments) 08/27/2018            The following portions of the patient's history were reviewed and updated as appropriate: allergies, current medications, past family history, past medical history, past social history, past surgical history and problem list.     Past Medical History:   Diagnosis Date    Asthma     Kidney stone 04/18/2023       Past Surgical History:   Procedure Laterality Date    KNEE SURGERY Left     CT CYSTO/URETERO W/LITHOTRIPSY &INDWELL STENT INSRT Left 4/26/2023    Procedure: CYSTOSCOPY URETEROSCOPY WITH LITHOTRIPSY HOLMIUM LASER, INSERTION STENT URETERAL;  Surgeon: Frank D'Amico, MD;  Location:  MAIN OR;  Service: Urology    TIBIA FRACTURE SURGERY         Family History   Problem Relation Age of Onset    Diabetes Mother     Diabetes Father     Hypertension Father     Kidney disease Father     Stroke Father     Cancer Maternal Grandfather         Throat cancer    Diabetes Maternal Grandfather     Diabetes Maternal  Grandmother     Diabetes Maternal Uncle          Medications have been verified.        Objective   /84   Pulse 90   Temp (!) 97.1 °F (36.2 °C)   Resp 20   Wt 79.8 kg (176 lb)   SpO2 98%   BMI 27.16 kg/m²        Physical Exam     Physical Exam  Vitals and nursing note reviewed.   Constitutional:       General: He is not in acute distress.     Appearance: Normal appearance. He is normal weight. He is not ill-appearing.   HENT:      Head: Normocephalic and atraumatic.      Right Ear: Tympanic membrane, ear canal and external ear normal.      Left Ear: Tympanic membrane, ear canal and external ear normal.      Nose: Nose normal.      Mouth/Throat:      Lips: Pink.      Mouth: Mucous membranes are moist.      Pharynx: Oropharynx is clear.   Eyes:      Extraocular Movements: Extraocular movements intact.      Conjunctiva/sclera: Conjunctivae normal.      Pupils: Pupils are equal, round, and reactive to light.   Cardiovascular:      Rate and Rhythm: Normal rate and regular rhythm.      Pulses: Normal pulses.      Heart sounds: Normal heart sounds.   Pulmonary:      Effort: Pulmonary effort is normal.      Breath sounds: Normal breath sounds. No decreased breath sounds, wheezing or rhonchi.   Abdominal:      General: Abdomen is flat. Bowel sounds are normal.      Palpations: Abdomen is soft.   Musculoskeletal:         General: Normal range of motion.      Cervical back: Full passive range of motion without pain, normal range of motion and neck supple.   Skin:     General: Skin is warm.      Capillary Refill: Capillary refill takes less than 2 seconds.      Findings: No rash.   Neurological:      General: No focal deficit present.      Mental Status: He is alert and oriented to person, place, and time.   Psychiatric:         Mood and Affect: Mood normal.         Behavior: Behavior normal.

## 2024-12-04 ENCOUNTER — OFFICE VISIT (OUTPATIENT)
Dept: URGENT CARE | Facility: CLINIC | Age: 44
End: 2024-12-04
Payer: COMMERCIAL

## 2024-12-04 VITALS
WEIGHT: 179 LBS | HEART RATE: 89 BPM | OXYGEN SATURATION: 99 % | DIASTOLIC BLOOD PRESSURE: 80 MMHG | SYSTOLIC BLOOD PRESSURE: 124 MMHG | BODY MASS INDEX: 28.09 KG/M2 | RESPIRATION RATE: 18 BRPM | HEIGHT: 67 IN | TEMPERATURE: 98.4 F

## 2024-12-04 DIAGNOSIS — J02.9 ACUTE PHARYNGITIS, UNSPECIFIED ETIOLOGY: Primary | ICD-10-CM

## 2024-12-04 LAB — S PYO AG THROAT QL: NEGATIVE

## 2024-12-04 PROCEDURE — G0382 LEV 3 HOSP TYPE B ED VISIT: HCPCS

## 2024-12-04 PROCEDURE — 87880 STREP A ASSAY W/OPTIC: CPT

## 2024-12-04 PROCEDURE — S9083 URGENT CARE CENTER GLOBAL: HCPCS

## 2024-12-04 NOTE — LETTER
December 4, 2024     Patient: Dre Billingsley Jr.   YOB: 1980   Date of Visit: 12/4/2024       To Whom It May Concern:    It is my medical opinion that Dre Billingsley may return to work on 12/5/2024 .    If you have any questions or concerns, please don't hesitate to call.         Sincerely,        CONSTANCE PICKARD    CC: No Recipients

## 2024-12-04 NOTE — PROGRESS NOTES
[facility]        NAME: Dre Billingsley Jr. is a 44 y.o. male  : 1980    MRN: [id number]  DATE: 2024  TIME: 6:14 PM    Assessment and Plan   Acute pharyngitis, unspecified etiology [J02.9]  1. Acute pharyngitis, unspecified etiology  POCT rapid ANTIGEN strepA        Rapid strep negative.  Symptoms consistent with viral illness recommend continued supportive care    Patient Instructions   Strep is negative this is likely viral in nature.    Follow up with PCP in 3-5 days.  Proceed to  ER if symptoms worsen.    Chief Complaint     Chief Complaint   Patient presents with    Sore Throat     Sore throat for 1 week, seen here twice. Sore throat comes and goes         History of Present Illness       Patient is a 44 year old male who presents to the office today for a sore throat since Monday. Worse with eating and worse in the morning. Does go away in the morning.     Sore Throat   Associated symptoms include coughing.       Review of Systems   Review of Systems   Constitutional:  Negative for fever.   HENT:  Positive for sore throat.    Respiratory:  Positive for cough.    All other systems reviewed and are negative.        Current Medications       Current Outpatient Medications:     albuterol (PROVENTIL HFA,VENTOLIN HFA) 90 mcg/act inhaler, Inhale 2 puffs every 6 (six) hours as needed for wheezing, Disp: , Rfl:     ferrous sulfate 325 (65 Fe) mg tablet, Take 325 mg by mouth if needed 3 x wk, Disp: , Rfl:     methylPREDNISolone 4 MG tablet therapy pack, Use as directed on package, Disp: 1 each, Rfl: 0    clobetasol (TEMOVATE) 0.05 % cream, Apply topically 2 (two) times a day (Patient not taking: Reported on 2024), Disp: 30 g, Rfl: 1    Multiple Vitamin (multivitamin) tablet, Take 1 tablet by mouth daily (Patient not taking: Reported on 2024), Disp: , Rfl:     ondansetron (Zofran ODT) 4 mg disintegrating tablet, Take 1 tablet (4 mg total) by mouth every 6 (six) hours as needed  "for nausea or vomiting (Patient not taking: Reported on 12/4/2024), Disp: 20 tablet, Rfl: 0    Current Allergies     Allergies as of 12/04/2024 - Reviewed 12/04/2024   Allergen Reaction Noted    Sulfa antibiotics Hives 09/20/2022    Bee venom Swelling 08/11/2000    Other Other (See Comments) 08/27/2018            The following portions of the patient's history were reviewed and updated as appropriate: allergies, current medications, past family history, past medical history, past social history, past surgical history and problem list.     Past Medical History:   Diagnosis Date    Asthma     Kidney stone 04/18/2023       Past Surgical History:   Procedure Laterality Date    KNEE SURGERY Left     IN CYSTO/URETERO W/LITHOTRIPSY &INDWELL STENT INSRT Left 4/26/2023    Procedure: CYSTOSCOPY URETEROSCOPY WITH LITHOTRIPSY HOLMIUM LASER, INSERTION STENT URETERAL;  Surgeon: Frank D'Amico, MD;  Location:  MAIN OR;  Service: Urology    TIBIA FRACTURE SURGERY         Family History   Problem Relation Age of Onset    Diabetes Mother     Diabetes Father     Hypertension Father     Kidney disease Father     Stroke Father     Cancer Maternal Grandfather         Throat cancer    Diabetes Maternal Grandfather     Diabetes Maternal Grandmother     Diabetes Maternal Uncle          Medications have been verified.        Objective   /80   Pulse 89   Temp 98.4 °F (36.9 °C) (Tympanic)   Resp 18   Ht 5' 7\" (1.702 m)   Wt 81.2 kg (179 lb)   SpO2 99%   BMI 28.04 kg/m²        Physical Exam     Physical Exam  Vitals and nursing note reviewed.   Constitutional:       Appearance: He is well-developed and normal weight.   HENT:      Right Ear: Tympanic membrane normal.      Left Ear: Tympanic membrane normal.      Nose: Congestion present.      Mouth/Throat:      Pharynx: Posterior oropharyngeal erythema present.      Tonsils: 2+ on the right. 2+ on the left.   Cardiovascular:      Rate and Rhythm: Normal rate and regular rhythm.     "  Heart sounds: Normal heart sounds.   Pulmonary:      Effort: Pulmonary effort is normal.      Breath sounds: Normal breath sounds.   Lymphadenopathy:      Cervical: No cervical adenopathy.   Skin:     General: Skin is warm.      Capillary Refill: Capillary refill takes less than 2 seconds.   Neurological:      Mental Status: He is alert.

## 2024-12-31 ENCOUNTER — OFFICE VISIT (OUTPATIENT)
Dept: URGENT CARE | Facility: CLINIC | Age: 44
End: 2024-12-31
Payer: COMMERCIAL

## 2024-12-31 VITALS
TEMPERATURE: 100.4 F | OXYGEN SATURATION: 97 % | SYSTOLIC BLOOD PRESSURE: 124 MMHG | WEIGHT: 180 LBS | HEIGHT: 68 IN | DIASTOLIC BLOOD PRESSURE: 72 MMHG | HEART RATE: 116 BPM | RESPIRATION RATE: 18 BRPM | BODY MASS INDEX: 27.28 KG/M2

## 2024-12-31 DIAGNOSIS — R50.9 FEVER, UNSPECIFIED FEVER CAUSE: Primary | ICD-10-CM

## 2024-12-31 PROCEDURE — 87636 SARSCOV2 & INF A&B AMP PRB: CPT

## 2024-12-31 PROCEDURE — S9083 URGENT CARE CENTER GLOBAL: HCPCS

## 2024-12-31 PROCEDURE — G0382 LEV 3 HOSP TYPE B ED VISIT: HCPCS

## 2024-12-31 RX ORDER — OSELTAMIVIR PHOSPHATE 75 MG/1
75 CAPSULE ORAL 2 TIMES DAILY
Qty: 10 CAPSULE | Refills: 0 | Status: SHIPPED | OUTPATIENT
Start: 2024-12-31 | End: 2025-01-05

## 2024-12-31 NOTE — PATIENT INSTRUCTIONS
Influenza Home Care Guidelines    Your healthcare provider and/or public health staff have evaluated you and have determined that you do not need to remain in the hospital at this time.  At this time you can be isolated at home where you will be monitored by staff from your local or state health department. You should carefully follow the prevention and isolation steps below until a healthcare provider or local or state health department says that you can return to your normal activities.      Stay home except to get medical care    People who are mildly ill with influenza are able to isolate at home during their illness. You should restrict activities outside your home, except for getting medical care. Do not go to work, school, or public areas. Avoid using public transportation, ride-sharing, or taxis.    Separate yourself from other people in your home    People: As much as possible, you should stay in a specific room and away from other people in your home. Also, you should use a separate bathroom, if available.    Call ahead before visiting your doctor    If you have a medical appointment, call the healthcare provider and tell them that you have or may have influenza. This will help the healthcare provider’s office take steps to keep other people from getting infected or exposed.    Wear a facemask    You should wear a facemask when you are around other people (e.g., sharing a room or vehicle) or pets and before you enter a healthcare provider’s office. If you are not able to wear a facemask (for example, because it causes trouble breathing), then people who live with you should not stay in the same room with you, or they should wear a facemask if they enter your room.    Cover your coughs and sneezes    Cover your mouth and nose with a tissue when you cough or sneeze. Throw used tissues in a lined trash can. Immediately wash your hands with soap and water for at least 20 seconds or, if soap and water are not  available, clean your hands with an alcohol-based hand  that contains at least 60% alcohol.    Clean your hands often    Wash your hands often with soap and water for at least 20 seconds, especially after blowing your nose, coughing, or sneezing; going to the bathroom; and before eating or preparing food. If soap and water are not readily available, use an alcohol-based hand  with at least 60% alcohol, covering all surfaces of your hands and rubbing them together until they feel dry.  Soap and water are the best option if hands are visibly dirty. Avoid touching your eyes, nose, and mouth with unwashed hands.    Avoid sharing personal household items    You should not share dishes, drinking glasses, cups, eating utensils, towels, or bedding with other people or pets in your home. After using these items, they should be washed thoroughly with soap and water.    Clean all “high-touch” surfaces everyday    High touch surfaces include counters, tabletops, doorknobs, bathroom fixtures, toilets, phones, keyboards, tablets, and bedside tables. Also, clean any surfaces that may have blood, stool, or body fluids on them. Use a household cleaning spray or wipe, according to the label instructions. Labels contain instructions for safe and effective use of the cleaning product including precautions you should take when applying the product, such as wearing gloves and making sure you have good ventilation during use of the product.    Monitor your symptoms    Seek prompt medical attention if your illness is worsening (e.g., difficulty breathing). Before seeking care, call your healthcare provider and tell them that you have, or are being evaluated for, influenza. Put on a facemask before you enter the facility. These steps will help the healthcare provider’s office to keep other people in the office or waiting room from getting infected or exposed. Ask your healthcare provider to call the local or [facility]  department. Persons who are placed under active monitoring or facilitated self-monitoring should follow instructions provided by their local health department or occupational health professionals, as appropriate.  If you have a medical emergency and need to call 911, notify the dispatch personnel that you have, or are being evaluated for influenza. If possible, put on a facemask before emergency medical services arrive.    Discontinuing home isolation    Patients with confirmed influenza should remain under home isolation precautions until the following conditions are met:   They have had no fever for at least 24 hours (that is one full day of no fever without the use medicine that reduces fevers i.e: acetaminophen (Tylenol) and ibuprofen (motrin) )  AND  other symptoms have improved (for example, when their cough or shortness of breath have improved)

## 2024-12-31 NOTE — PROGRESS NOTES
[facility]        NAME: Dre Billingsley Jr. is a 44 y.o. male  : 1980    MRN: [id number]  DATE: 2024  TIME: 4:02 PM    Assessment and Plan   Fever, unspecified fever cause [R50.9]  1. Fever, unspecified fever cause  oseltamivir (TAMIFLU) 75 mg capsule    COVID/FLU        Will test for influenza. Will start tamiflu given symptoms and duration of onset.    Patient Instructions     Influenza Home Care Guidelines    Your healthcare provider and/or public health staff have evaluated you and have determined that you do not need to remain in the hospital at this time.  At this time you can be isolated at home where you will be monitored by staff from your local or state health department. You should carefully follow the prevention and isolation steps below until a healthcare provider or local or state health department says that you can return to your normal activities.      Stay home except to get medical care    People who are mildly ill with influenza are able to isolate at home during their illness. You should restrict activities outside your home, except for getting medical care. Do not go to work, school, or public areas. Avoid using public transportation, ride-sharing, or taxis.    Separate yourself from other people in your home    People: As much as possible, you should stay in a specific room and away from other people in your home. Also, you should use a separate bathroom, if available.    Call ahead before visiting your doctor    If you have a medical appointment, call the healthcare provider and tell them that you have or may have influenza. This will help the healthcare provider’s office take steps to keep other people from getting infected or exposed.    Wear a facemask    You should wear a facemask when you are around other people (e.g., sharing a room or vehicle) or pets and before you enter a healthcare provider’s office. If you are not able to wear a facemask (for  example, because it causes trouble breathing), then people who live with you should not stay in the same room with you, or they should wear a facemask if they enter your room.    Cover your coughs and sneezes    Cover your mouth and nose with a tissue when you cough or sneeze. Throw used tissues in a lined trash can. Immediately wash your hands with soap and water for at least 20 seconds or, if soap and water are not available, clean your hands with an alcohol-based hand  that contains at least 60% alcohol.    Clean your hands often    Wash your hands often with soap and water for at least 20 seconds, especially after blowing your nose, coughing, or sneezing; going to the bathroom; and before eating or preparing food. If soap and water are not readily available, use an alcohol-based hand  with at least 60% alcohol, covering all surfaces of your hands and rubbing them together until they feel dry.  Soap and water are the best option if hands are visibly dirty. Avoid touching your eyes, nose, and mouth with unwashed hands.    Avoid sharing personal household items    You should not share dishes, drinking glasses, cups, eating utensils, towels, or bedding with other people or pets in your home. After using these items, they should be washed thoroughly with soap and water.    Clean all “high-touch” surfaces everyday    High touch surfaces include counters, tabletops, doorknobs, bathroom fixtures, toilets, phones, keyboards, tablets, and bedside tables. Also, clean any surfaces that may have blood, stool, or body fluids on them. Use a household cleaning spray or wipe, according to the label instructions. Labels contain instructions for safe and effective use of the cleaning product including precautions you should take when applying the product, such as wearing gloves and making sure you have good ventilation during use of the product.  Follow up with PCP in 3-5 days.  Proceed to  ER if symptoms  worsen.    Chief Complaint     Chief Complaint   Patient presents with    Cold Like Symptoms     Started 2 days ago  Headache, sore throat, body aches, coughing, sinus congestion  OTC tylenol cold and flu         History of Present Illness       Patient is a 44 year old male who presents to the office today for congestion, sore throat, headache, fever, myalgia. Notes mother sick and gf sick at home. Symptoms started on Sunday. Also has cough.         Review of Systems   Review of Systems   Constitutional:  Positive for chills and fever.   HENT:  Positive for congestion and sore throat.    Musculoskeletal:  Positive for myalgias.   Neurological:  Positive for headaches.   All other systems reviewed and are negative.        Current Medications       Current Outpatient Medications:     albuterol (PROVENTIL HFA,VENTOLIN HFA) 90 mcg/act inhaler, Inhale 2 puffs every 6 (six) hours as needed for wheezing, Disp: , Rfl:     ferrous sulfate 325 (65 Fe) mg tablet, Take 325 mg by mouth if needed 3 x wk, Disp: , Rfl:     oseltamivir (TAMIFLU) 75 mg capsule, Take 1 capsule (75 mg total) by mouth 2 (two) times a day for 5 days, Disp: 10 capsule, Rfl: 0    clobetasol (TEMOVATE) 0.05 % cream, Apply topically 2 (two) times a day (Patient not taking: Reported on 11/27/2024), Disp: 30 g, Rfl: 1    methylPREDNISolone 4 MG tablet therapy pack, Use as directed on package (Patient not taking: Reported on 12/31/2024), Disp: 1 each, Rfl: 0    Multiple Vitamin (multivitamin) tablet, Take 1 tablet by mouth daily (Patient not taking: Reported on 12/31/2024), Disp: , Rfl:     ondansetron (Zofran ODT) 4 mg disintegrating tablet, Take 1 tablet (4 mg total) by mouth every 6 (six) hours as needed for nausea or vomiting (Patient not taking: Reported on 11/27/2024), Disp: 20 tablet, Rfl: 0    Current Allergies     Allergies as of 12/31/2024 - Reviewed 12/31/2024   Allergen Reaction Noted    Sulfa antibiotics Hives 09/20/2022    Bee venom Swelling  "08/11/2000    Other Other (See Comments) 08/27/2018            The following portions of the patient's history were reviewed and updated as appropriate: allergies, current medications, past family history, past medical history, past social history, past surgical history and problem list.     Past Medical History:   Diagnosis Date    Asthma     Kidney stone 04/18/2023       Past Surgical History:   Procedure Laterality Date    KNEE SURGERY Left     [place] CYSTO/URETERO W/LITHOTRIPSY &INDWELL STENT INSRT Left 4/26/2023    Procedure: CYSTOSCOPY URETEROSCOPY WITH LITHOTRIPSY HOLMIUM LASER, INSERTION STENT URETERAL;  Surgeon: Frank D'Amico, MD;  Location:  MAIN OR;  Service: Urology    TIBIA FRACTURE SURGERY         Family History   Problem Relation Age of Onset    Diabetes Mother     Diabetes Father     Hypertension Father     Kidney disease Father     Stroke Father     Cancer Maternal Grandfather         Throat cancer    Diabetes Maternal Grandfather     Diabetes Maternal Grandmother     Diabetes Maternal Uncle          Medications have been verified.        Objective   /72   Pulse (!) 116   Temp 100.4 °F (38 °C)   Resp 18   Ht 5' 7.5\" (1.715 m)   Wt 81.6 kg (180 lb)   SpO2 97%   BMI 27.78 kg/m²        Physical Exam     Physical Exam  Vitals and nursing note reviewed.   Constitutional:       Appearance: Normal appearance. He is normal weight.   HENT:      Nose: Congestion present.      Mouth/Throat:      Mouth: Mucous membranes are moist.   Cardiovascular:      Rate and Rhythm: Regular rhythm. Tachycardia present.      Pulses: Normal pulses.      Heart sounds: Normal heart sounds.   Pulmonary:      Effort: Pulmonary effort is normal.      Breath sounds: Normal breath sounds.   Skin:     General: Skin is warm.      Capillary Refill: Capillary refill takes less than 2 seconds.   Neurological:      Mental Status: He is alert.                   "

## 2025-01-01 LAB
FLUAV RNA RESP QL NAA+PROBE: NEGATIVE
FLUBV RNA RESP QL NAA+PROBE: NEGATIVE
SARS-COV-2 RNA RESP QL NAA+PROBE: POSITIVE

## 2025-01-02 ENCOUNTER — RESULTS FOLLOW-UP (OUTPATIENT)
Dept: URGENT CARE | Facility: CLINIC | Age: 45
End: 2025-01-02

## 2025-03-04 ENCOUNTER — OFFICE VISIT (OUTPATIENT)
Dept: URGENT CARE | Facility: CLINIC | Age: 45
End: 2025-03-04
Payer: COMMERCIAL

## 2025-03-04 VITALS
WEIGHT: 180 LBS | HEIGHT: 68 IN | TEMPERATURE: 98.3 F | DIASTOLIC BLOOD PRESSURE: 75 MMHG | HEART RATE: 78 BPM | SYSTOLIC BLOOD PRESSURE: 127 MMHG | RESPIRATION RATE: 18 BRPM | BODY MASS INDEX: 27.28 KG/M2 | OXYGEN SATURATION: 99 %

## 2025-03-04 DIAGNOSIS — J02.9 SORE THROAT: Primary | ICD-10-CM

## 2025-03-04 LAB — S PYO AG THROAT QL: NEGATIVE

## 2025-03-04 PROCEDURE — 87880 STREP A ASSAY W/OPTIC: CPT

## 2025-03-04 PROCEDURE — S9083 URGENT CARE CENTER GLOBAL: HCPCS

## 2025-03-04 PROCEDURE — G0382 LEV 3 HOSP TYPE B ED VISIT: HCPCS

## 2025-03-04 PROCEDURE — 87070 CULTURE OTHR SPECIMN AEROBIC: CPT

## 2025-03-04 NOTE — PATIENT INSTRUCTIONS
"Pt appears to have a viral upper respiratory infection. Antibiotics are contraindicated at this time and would not help you feel better faster.     Although the symptoms are troublesome, usually the patient's body is able to recover from a viral infection on an average time of 7-10 days.  Fever, if any, typically resolves after 3-5 days.  If patient has sore throat, typically this resolves within 3-5 days.  Any nasal congestion, runny nose, post nasal drip typically begin to  improve after 7-10 days.  Any cough may linger over a couple weeks.  Please note that having a cough is not necessarily a bad thing.  It often times is part of our body's protective mechanism to help keep our airways clear.      Please note that yellow mucous doesn't necessarily mean a \"bacterial\" infection.  Yellow mucous doesn't automatically mean that an antibiotic is needed.  It is not unusual for mucus to become more discolored in the days after the start of an upper respiratory infection.  Often times this is due to mucous that has thickened  with white blood cells that have flooded the mucosa to try and fight the viral infection.      Ear Pain may occur when the eustachian tubes become blocked with mucous or swollen due to acute inflammation from illness. May give Ibuprofen or Tylenol as needed for comfort.  May also use warm compress against ear for comfort.  If ear ache is persisting and not improving over 2-3 days or if there is any gross drainage coming from ear, please seek further evaluation.      Natural remedies to help provide comfort for cough/ cold symptoms include: one teaspoon of honey (only in infants over 1 year of age), increased vitamin C (oranges, prema, etc.), ginger, and drinking plenty of fluids. Vaporizer by bedside.    If you should have prolonged symptoms (Greater than 10 days), worsening symptoms, or any new symptoms please seek further medical attention.    If you would be having difficulty breathing, seek " further evaluation by calling 911 or proceeding to ER for further evaluation.

## 2025-03-04 NOTE — PROGRESS NOTES
"  [facility]        NAME: Dre Billingsley Jr. is a 44 y.o. male  : 1980    MRN: [id number]  DATE: 2025  TIME: 8:30 AM    Assessment and Plan   Sore throat [J02.9]  1. Sore throat  POCT rapid strepA    Throat culture        Strep negative.  Will send throat culture.  Symptoms appear viral recommend supportive care    Patient Instructions   Pt appears to have a viral upper respiratory infection. Antibiotics are contraindicated at this time and would not help you feel better faster.     Although the symptoms are troublesome, usually the patient's body is able to recover from a viral infection on an average time of 7-10 days.  Fever, if any, typically resolves after 3-5 days.  If patient has sore throat, typically this resolves within 3-5 days.  Any nasal congestion, runny nose, post nasal drip typically begin to  improve after 7-10 days.  Any cough may linger over a couple weeks.  Please note that having a cough is not necessarily a bad thing.  It often times is part of our body's protective mechanism to help keep our airways clear.      Please note that yellow mucous doesn't necessarily mean a \"bacterial\" infection.  Yellow mucous doesn't automatically mean that an antibiotic is needed.  It is not unusual for mucus to become more discolored in the days after the start of an upper respiratory infection.  Often times this is due to mucous that has thickened  with white blood cells that have flooded the mucosa to try and fight the viral infection.      Ear Pain may occur when the eustachian tubes become blocked with mucous or swollen due to acute inflammation from illness. May give Ibuprofen or Tylenol as needed for comfort.  May also use warm compress against ear for comfort.  If ear ache is persisting and not improving over 2-3 days or if there is any gross drainage coming from ear, please seek further evaluation.      Natural remedies to help provide comfort for cough/ cold symptoms include: " "one teaspoon of honey (only in infants over 1 year of age), increased vitamin C (oranges, prema, etc.), ginger, and drinking plenty of fluids. Vaporizer by bedside.    If you should have prolonged symptoms (Greater than 10 days), worsening symptoms, or any new symptoms please seek further medical attention.    If you would be having difficulty breathing, seek further evaluation by calling 911 or proceeding to ER for further evaluation.     Follow up with PCP in 3-5 days.  Proceed to  ER if symptoms worsen.    Chief Complaint     Chief Complaint   Patient presents with    Cold Like Symptoms     Cough, congestion, headache , sore throat for 2 days         History of Present Illness       Patient is a 44-year-old male who presents to the office today for cough, congestion, headache, sore throat for 2 days.  Has been going to work does not need a work note denies sick contacts at home states he feels similar to when he had COVID but just \"not as bad\".  Denies fever but does report that he had sweating 2 nights ago        Review of Systems   Review of Systems   Constitutional:  Positive for diaphoresis. Negative for fever.   HENT:  Positive for congestion and sore throat.    Respiratory:  Positive for cough.    Neurological:  Positive for headaches.   All other systems reviewed and are negative.        Current Medications       Current Outpatient Medications:     albuterol (PROVENTIL HFA,VENTOLIN HFA) 90 mcg/act inhaler, Inhale 2 puffs every 6 (six) hours as needed for wheezing, Disp: , Rfl:     ferrous sulfate 325 (65 Fe) mg tablet, Take 325 mg by mouth if needed 3 x wk, Disp: , Rfl:     Current Allergies     Allergies as of 03/04/2025 - Reviewed 03/04/2025   Allergen Reaction Noted    Sulfa antibiotics Hives 09/20/2022    Bee venom Swelling 08/11/2000    Other Other (See Comments) 08/27/2018            The following portions of the patient's history were reviewed and updated as appropriate: allergies, current " "medications, past family history, past medical history, past social history, past surgical history and problem list.     Past Medical History:   Diagnosis Date    Asthma     Kidney stone 04/18/2023       Past Surgical History:   Procedure Laterality Date    KNEE SURGERY Left     [place] CYSTO/URETERO W/LITHOTRIPSY &INDWELL STENT INSRT Left 4/26/2023    Procedure: CYSTOSCOPY URETEROSCOPY WITH LITHOTRIPSY HOLMIUM LASER, INSERTION STENT URETERAL;  Surgeon: Frank D'Amico, MD;  Location:  MAIN OR;  Service: Urology    TIBIA FRACTURE SURGERY         Family History   Problem Relation Age of Onset    Diabetes Mother     Diabetes Father     Hypertension Father     Kidney disease Father     Stroke Father     Cancer Maternal Grandfather         Throat cancer    Diabetes Maternal Grandfather     Diabetes Maternal Grandmother     Diabetes Maternal Uncle          Medications have been verified.        Objective   /75   Pulse 78   Temp 98.3 °F (36.8 °C) (Temporal)   Resp 18   Ht 5' 7.5\" (1.715 m)   Wt 81.6 kg (180 lb)   SpO2 99%   BMI 27.78 kg/m²        Physical Exam     Physical Exam  Vitals and nursing note reviewed.   Constitutional:       Appearance: Normal appearance. He is normal weight.   HENT:      Right Ear: Tympanic membrane normal.      Left Ear: Tympanic membrane normal.      Nose: Congestion present.      Mouth/Throat:      Mouth: Mucous membranes are moist.      Pharynx: Posterior oropharyngeal erythema present.   Cardiovascular:      Rate and Rhythm: Normal rate and regular rhythm.      Pulses: Normal pulses.      Heart sounds: Normal heart sounds.   Pulmonary:      Effort: Pulmonary effort is normal.      Breath sounds: Normal breath sounds.   Skin:     General: Skin is warm.      Capillary Refill: Capillary refill takes less than 2 seconds.   Neurological:      Mental Status: He is alert.                   "

## 2025-03-06 ENCOUNTER — RESULTS FOLLOW-UP (OUTPATIENT)
Dept: URGENT CARE | Facility: CLINIC | Age: 45
End: 2025-03-06

## 2025-03-06 LAB — BACTERIA THROAT CULT: NORMAL

## 2025-03-09 ENCOUNTER — OFFICE VISIT (OUTPATIENT)
Dept: URGENT CARE | Facility: MEDICAL CENTER | Age: 45
End: 2025-03-09
Payer: COMMERCIAL

## 2025-03-09 VITALS
TEMPERATURE: 97.7 F | DIASTOLIC BLOOD PRESSURE: 72 MMHG | SYSTOLIC BLOOD PRESSURE: 122 MMHG | OXYGEN SATURATION: 99 % | HEART RATE: 86 BPM | RESPIRATION RATE: 14 BRPM

## 2025-03-09 DIAGNOSIS — J01.90 ACUTE SINUSITIS, RECURRENCE NOT SPECIFIED, UNSPECIFIED LOCATION: Primary | ICD-10-CM

## 2025-03-09 PROCEDURE — G0382 LEV 3 HOSP TYPE B ED VISIT: HCPCS

## 2025-03-09 PROCEDURE — S9083 URGENT CARE CENTER GLOBAL: HCPCS

## 2025-03-09 RX ORDER — AMOXICILLIN 875 MG/1
875 TABLET, COATED ORAL 2 TIMES DAILY
Qty: 14 TABLET | Refills: 0 | Status: SHIPPED | OUTPATIENT
Start: 2025-03-09 | End: 2025-03-16

## 2025-03-09 RX ORDER — AZELASTINE 1 MG/ML
1 SPRAY, METERED NASAL 2 TIMES DAILY
Qty: 1 ML | Refills: 0 | Status: SHIPPED | OUTPATIENT
Start: 2025-03-09

## 2025-03-09 NOTE — PATIENT INSTRUCTIONS
You may take over the counter Tylenol (Acetaminophen) and/or Motrin (Ibuprofen) as needed, as directed on packaging.   Be sure to get plenty of rest, and drinking fluids to remain hydrated.     Please follow up with your primary provider in the next several days. Should you have any worsening of symptoms, or lack of improvement please be re-evaluated. If needed for significant concerns, consider 911 or ER evaluation.     Over the counter decongestants can be used, only as directed on the box. However if you have any history of cardiac disease or high blood pressure these should be avoided, as we caution the use of these since they can make place strain on your heart and cause increase in blood pressure. It is recommended in lieu of decongestants to use cool mist vaporizer, saline nasal spray to relieve nasal congestion. It is also important to remain hydrated and drink plenty of fluids (avoiding caffeine and alcohol).     OVER THE COUNTER: Flonase (fluticasone) nasal spray or Astepro (azelastine) nasal spray may be appropriate for your symptoms as well. Please follow the directions on the package for use. (Store brand is perfectly fine). Please AVOID the use of Afrin (oxymetazoline) nasal spray for longer than 2 days as this can cause rebound congestion which is typically worse than the congestion you started with.

## 2025-03-09 NOTE — PROGRESS NOTES
[facility]        NAME: Dre Billingsley Jr. is a 44 y.o. male  : 1980    MRN: [id number]  DATE: 2025  TIME: 2:44 PM    Assessment and Plan   Acute sinusitis, recurrence not specified, unspecified location [J01.90]  1. Acute sinusitis, recurrence not specified, unspecified location  amoxicillin (AMOXIL) 875 mg tablet    azelastine (ASTELIN) 0.1 % nasal spray            Patient Instructions       Follow up with PCP in 3-5 days.  Proceed to  ER if symptoms worsen.    If tests are performed, our office will contact you with results only if changes need to made to the care plan discussed with you at the visit. You can review your full results on [facility].    Chief Complaint     Chief Complaint   Patient presents with   • Nasal Congestion     Sore throat on going since last visit 3/05. Nasal congestion started last .  Yellow sputum w/coughing. OTC dayquill taken this am, also took tessalon perles for cough.    • Sore Throat   • Cough         History of Present Illness       Patient presents for evaluation of sinus congestion, sore throat, and cough. Symptoms have been ongoing. Mother was also recently here for similar. However mother has asthma which has been triggered by her illness making her symptoms worse than Will's. Patient taking OTC dayqil in the AM for his symptoms which he reports some improvement of symptoms. He has also been taking Tessalon Perles for his symptoms He does have a history of asthma as well. However he does not feel that it has triggered his asthma much. The patient's prior available electronic records were briefly reviewed including recent labs/visits/hospitalizations. Had flu in December, and had a viral pharyngitis earlier this month. Has continued to have sore throat and sinus congestion since previous visit. He has not measured his temperature but has felt feverish.         Review of Systems   Review of Systems   Constitutional:  Positive for  fatigue. Negative for chills and fever.   HENT:  Positive for congestion. Negative for rhinorrhea, sore throat and trouble swallowing.    Respiratory:  Positive for cough and chest tightness. Negative for shortness of breath.    Gastrointestinal:  Negative for abdominal pain, constipation, diarrhea, nausea and vomiting.   Skin:  Negative for color change and rash.   Neurological:  Negative for dizziness, light-headedness and headaches.         Current Medications       Current Outpatient Medications:   •  albuterol (PROVENTIL HFA,VENTOLIN HFA) 90 mcg/act inhaler, Inhale 2 puffs every 6 (six) hours as needed for wheezing, Disp: , Rfl:   •  amoxicillin (AMOXIL) 875 mg tablet, Take 1 tablet (875 mg total) by mouth 2 (two) times a day for 7 days, Disp: 14 tablet, Rfl: 0  •  azelastine (ASTELIN) 0.1 % nasal spray, 1 spray into each nostril 2 (two) times a day Use in each nostril as directed, Disp: 1 mL, Rfl: 0  •  ferrous sulfate 325 (65 Fe) mg tablet, Take 325 mg by mouth if needed 3 x wk, Disp: , Rfl:     Current Allergies     Allergies as of 03/09/2025 - Reviewed 03/09/2025   Allergen Reaction Noted   • Sulfa antibiotics Hives 09/20/2022   • Bee venom Swelling 08/11/2000   • Other Other (See Comments) 08/27/2018            The following portions of the patient's history were reviewed and updated as appropriate: allergies, current medications, past family history, past medical history, past social history, past surgical history and problem list.     Past Medical History:   Diagnosis Date   • Asthma    • Kidney stone 04/18/2023       Past Surgical History:   Procedure Laterality Date   • KNEE SURGERY Left    • [place] CYSTO/URETERO W/LITHOTRIPSY &INDWELL STENT INSRT Left 4/26/2023    Procedure: CYSTOSCOPY URETEROSCOPY WITH LITHOTRIPSY HOLMIUM LASER, INSERTION STENT URETERAL;  Surgeon: Frank D'Amico, MD;  Location:  MAIN OR;  Service: Urology   • TIBIA FRACTURE SURGERY         Family History   Problem Relation Age of Onset    • Diabetes Mother    • Diabetes Father    • Hypertension Father    • Kidney disease Father    • Stroke Father    • Cancer Maternal Grandfather         Throat cancer   • Diabetes Maternal Grandfather    • Diabetes Maternal Grandmother    • Diabetes Maternal Uncle          Medications have been verified.        Objective   /72   Pulse 86   Temp 97.7 °F (36.5 °C)   Resp 14   SpO2 99%        Physical Exam     Physical Exam  Vitals and nursing note reviewed.   Constitutional:       General: He is not in acute distress.     Appearance: Normal appearance. He is normal weight. He is not ill-appearing.   HENT:      Head: Normocephalic and atraumatic.      Right Ear: Tympanic membrane, ear canal and external ear normal. Tympanic membrane is not erythematous or bulging.      Left Ear: Tympanic membrane, ear canal and external ear normal. Tympanic membrane is not erythematous or bulging.      Nose: Congestion present.      Right Sinus: No maxillary sinus tenderness or frontal sinus tenderness.      Left Sinus: Maxillary sinus tenderness and frontal sinus tenderness present.      Mouth/Throat:      Lips: Pink.      Mouth: Mucous membranes are moist.      Pharynx: Oropharynx is clear. Uvula midline. Posterior oropharyngeal erythema and postnasal drip present.   Eyes:      Extraocular Movements: Extraocular movements intact.      Conjunctiva/sclera: Conjunctivae normal.      Pupils: Pupils are equal, round, and reactive to light.   Cardiovascular:      Rate and Rhythm: Normal rate and regular rhythm.      Pulses: Normal pulses.      Heart sounds: Normal heart sounds.   Pulmonary:      Effort: Pulmonary effort is normal.      Breath sounds: Normal breath sounds. No decreased breath sounds, wheezing, rhonchi or rales.   Abdominal:      General: Abdomen is flat. Bowel sounds are normal.      Palpations: Abdomen is soft.   Musculoskeletal:         General: Normal range of motion.      Cervical back: Full passive range of  motion without pain, normal range of motion and neck supple.   Lymphadenopathy:      Cervical: Cervical adenopathy present.   Skin:     General: Skin is warm and dry.      Capillary Refill: Capillary refill takes less than 2 seconds.      Findings: No rash.   Neurological:      General: No focal deficit present.      Mental Status: He is alert and oriented to person, place, and time.   Psychiatric:         Mood and Affect: Mood normal.         Behavior: Behavior normal.

## 2025-07-14 ENCOUNTER — OFFICE VISIT (OUTPATIENT)
Dept: URGENT CARE | Facility: CLINIC | Age: 45
End: 2025-07-14
Payer: COMMERCIAL

## 2025-07-14 VITALS
HEART RATE: 78 BPM | SYSTOLIC BLOOD PRESSURE: 137 MMHG | WEIGHT: 176 LBS | OXYGEN SATURATION: 98 % | HEIGHT: 68 IN | DIASTOLIC BLOOD PRESSURE: 63 MMHG | BODY MASS INDEX: 26.67 KG/M2 | TEMPERATURE: 98.3 F | RESPIRATION RATE: 16 BRPM

## 2025-07-14 DIAGNOSIS — J02.9 ACUTE PHARYNGITIS, UNSPECIFIED ETIOLOGY: Primary | ICD-10-CM

## 2025-07-14 LAB — S PYO AG THROAT QL: NEGATIVE

## 2025-07-14 PROCEDURE — S9083 URGENT CARE CENTER GLOBAL: HCPCS

## 2025-07-14 PROCEDURE — 87070 CULTURE OTHR SPECIMN AEROBIC: CPT

## 2025-07-14 PROCEDURE — G0382 LEV 3 HOSP TYPE B ED VISIT: HCPCS

## 2025-07-14 PROCEDURE — 87880 STREP A ASSAY W/OPTIC: CPT

## 2025-07-14 RX ORDER — AMOXICILLIN 500 MG/1
500 CAPSULE ORAL EVERY 12 HOURS SCHEDULED
Qty: 20 CAPSULE | Refills: 0 | Status: SHIPPED | OUTPATIENT
Start: 2025-07-14 | End: 2025-07-24

## 2025-07-14 NOTE — PATIENT INSTRUCTIONS
You may take over the counter Tylenol (Acetaminophen) and/or Motrin (Ibuprofen) as needed, as directed on packaging.     You may gargle with salt water - add 1/4 teaspoon of salt to 4-6 oz of warm water and gargle as needed for throat pain    Chloraseptic throat spray over the counter - use as directed on product packaging    Throat lozenges - use as directed on product packaging    Ice pops and cold liquids - consume to assist with throat pain    You should replace your toothbrush after strep throat. I recommend you replace it after 2-3 days of antibiotic use.     If unable to eat solid food, keep drinking fluids to avoid dehydration.     If you should have any difficulty swallowing your own saliva and you begin to drool or you have difficulty breathing and feel like your throat is closing you need to call 911 or go to the closest EMERGENCY ROOM!

## 2025-07-14 NOTE — PROGRESS NOTES
[facility]  Name: Dre Billingsley Jr.      : 1980      MRN: [id number]  Encounter Provider: TIFFANIE Naranjo  Encounter Date: 2025   Encounter department: [facility] [place]  :  Assessment & Plan  Acute pharyngitis, unspecified etiology    Orders:    POCT rapid ANTIGEN strepA    amoxicillin (AMOXIL) 500 mg capsule; Take 1 capsule (500 mg total) by mouth every 12 (twelve) hours for 10 days    Throat culture; Future  POCT Strep: negative       Patient Instructions    You may take over the counter Tylenol (Acetaminophen) and/or Motrin (Ibuprofen) as needed, as directed on packaging.     You may gargle with salt water - add 1/4 teaspoon of salt to 4-6 oz of warm water and gargle as needed for throat pain    Chloraseptic throat spray over the counter - use as directed on product packaging    Throat lozenges - use as directed on product packaging    Ice pops and cold liquids - consume to assist with throat pain    You should replace your toothbrush after strep throat. I recommend you replace it after 2-3 days of antibiotic use.     If unable to eat solid food, keep drinking fluids to avoid dehydration.     If you should have any difficulty swallowing your own saliva and you begin to drool or you have difficulty breathing and feel like your throat is closing you need to call 911 or go to the closest EMERGENCY ROOM!    Follow up with PCP in 3-5 days.  Proceed to  ER if symptoms worsen.    If tests are performed, our office will contact you with results only if changes need to made to the care plan discussed with you at the visit. You can review your full results on [facility].    Chief Complaint:   Chief Complaint   Patient presents with    Sore Throat     Sore throat started this morning. Did not take any OTC meds. Denies fever, chills.     History of Present Illness   44-year-old male with past medical history significant for asthma, bipolar 1 disorder, iron  "deficiency anemia presents to this clinic with complaint of sore throat which started this a.m.  Patient reports she did not take any over-the-counter medications.  Denies fever, chills, cough, cold symptoms, congestion, recent travel, or exposure to ill individuals.      History obtained from: patient    Review of Systems   HENT:  Positive for sore throat.      Past Medical History   Past Medical History[1]  Past Surgical History[2]  Family History[3]  he reports that he has never smoked. He has never used smokeless tobacco. He reports current alcohol use of about 1.0 standard drink of alcohol per week. He reports that he does not use drugs.  Current Outpatient Medications   Medication Instructions    albuterol (PROVENTIL HFA,VENTOLIN HFA) 90 mcg/act inhaler 2 puffs, Every 6 hours PRN    amoxicillin (AMOXIL) 500 mg, Oral, Every 12 hours scheduled    azelastine (ASTELIN) 0.1 % nasal spray 1 spray, Nasal, 2 times daily, Use in each nostril as directed    ferrous sulfate 325 mg, As needed   Allergies[4]     Objective   /63   Pulse 78   Temp 98.3 °F (36.8 °C)   Resp 16   Ht 5' 7.5\" (1.715 m)   Wt 79.8 kg (176 lb)   SpO2 98%   BMI 27.16 kg/m²      Physical Exam  Vitals and nursing note reviewed.   Constitutional:       General: He is not in acute distress.     Appearance: Normal appearance. He is normal weight. He is not ill-appearing.   HENT:      Head: Normocephalic and atraumatic.      Right Ear: Ear canal and external ear normal. Tympanic membrane is injected.      Left Ear: Ear canal and external ear normal. Tympanic membrane is injected.      Nose: Nose normal.      Mouth/Throat:      Lips: Pink.      Mouth: Mucous membranes are moist.      Pharynx: Uvula midline. Pharyngeal swelling, posterior oropharyngeal erythema and uvula swelling present.      Tonsils: No tonsillar exudate. 2+ on the right. 2+ on the left.     Eyes:      Extraocular Movements: Extraocular movements intact.      " "Conjunctiva/sclera: Conjunctivae normal.      Pupils: Pupils are equal, round, and reactive to light.     Neck:      Trachea: Trachea and phonation normal.     Cardiovascular:      Rate and Rhythm: Normal rate and regular rhythm.      Pulses: Normal pulses.      Heart sounds: Normal heart sounds.   Pulmonary:      Effort: Pulmonary effort is normal.      Breath sounds: Normal breath sounds.     Musculoskeletal:         General: Normal range of motion.      Cervical back: Normal range of motion and neck supple.   Lymphadenopathy:      Cervical: Cervical adenopathy present.     Skin:     General: Skin is warm and dry.      Capillary Refill: Capillary refill takes less than 2 seconds.     Neurological:      General: No focal deficit present.      Mental Status: He is alert.         Portions of the record may have been created with voice recognition software.  Occasional wrong word or \"sound a like\" substitutions may have occurred due to the inherent limitations of voice recognition software.  Read the chart carefully and recognize, using context, where substitutions have occurred.         [1]   Past Medical History:  Diagnosis Date    Asthma     Kidney stone 04/18/2023   [2]   Past Surgical History:  Procedure Laterality Date    KNEE SURGERY Left     [place] CYSTO/URETERO W/LITHOTRIPSY &INDWELL STENT INSRT Left 4/26/2023    Procedure: CYSTOSCOPY URETEROSCOPY WITH LITHOTRIPSY HOLMIUM LASER, INSERTION STENT URETERAL;  Surgeon: Frank D'Amico, MD;  Location:  MAIN OR;  Service: Urology    TIBIA FRACTURE SURGERY     [3]   Family History  Problem Relation Name Age of Onset    Diabetes Mother Lissy Billingsley     Diabetes Father Dre Analilia Sr     Hypertension Father Dre Analilia Sr     Kidney disease Father Dre Analilia Sr     Stroke Father Dre Billingsley Sr     Cancer Maternal Grandfather Don Hernandez         Throat cancer    Diabetes Maternal Grandfather Don Hernandez     Diabetes Maternal Grandmother Alicia Yeager     " Diabetes Maternal Uncle Don Hernandez    [4]   Allergies  Allergen Reactions    Sulfa Antibiotics Hives    Bee Venom Swelling     swelling    Other Other (See Comments)     Tide laundry detergent

## 2025-07-16 LAB — BACTERIA THROAT CULT: NORMAL

## (undated) DEVICE — SINGLE PORT MANIFOLD: Brand: NEPTUNE 2

## (undated) DEVICE — GLOVE SRG BIOGEL 6.5

## (undated) DEVICE — ENDOSCOPIC VALVE WITH ADAPTER.: Brand: SURSEAL® II

## (undated) DEVICE — SPECIMEN CONTAINER STERILE PEEL PACK

## (undated) DEVICE — 400 MICRON SINGLE-USE HOLMIUM FIBER ASSEMBLY WITH FLAT TIP: Brand: OPTILITE

## (undated) DEVICE — MAT FLOOR STEP DRI 24 X 36 IN N-STRL

## (undated) DEVICE — SCD SEQUENTIAL COMPRESSION COMFORT SLEEVE MEDIUM KNEE LENGTH: Brand: KENDALL SCD

## (undated) DEVICE — 200 MICRON SINGLE-USE HOLMIUM FIBER ASSEMBLY WITH FLAT TIP: Brand: OPTILITE

## (undated) DEVICE — 3M™ TEGADERM™ TRANSPARENT FILM DRESSING FRAME STYLE, 1624W, 2-3/8 IN X 2-3/4 IN (6 CM X 7 CM), 100/CT 4CT/CASE: Brand: 3M™ TEGADERM™

## (undated) DEVICE — DISPOSABLE OR TOWEL: Brand: CARDINAL HEALTH

## (undated) DEVICE — CHLORHEXIDINE 4PCT 4 OZ

## (undated) DEVICE — URO CATCHER BAG STERILE 0-UC32

## (undated) DEVICE — CATH URETERAL 5FR X 70 CM FLEX TIP POLYUR BARD

## (undated) DEVICE — STERILE SURGICAL LUBRICANT,  TUBE: Brand: SURGILUBE

## (undated) DEVICE — PACK TUR

## (undated) DEVICE — ASTOUND IMPERVIOUS SURGICAL GOWN: Brand: CONVERTORS

## (undated) DEVICE — SHEATH URETERAL ACCESS 12/14FR 45CM PROXIS

## (undated) DEVICE — TUBING SUCTION 5MM X 12 FT

## (undated) DEVICE — SYRINGE 10ML LL

## (undated) DEVICE — GUIDEWIRE STRGHT TIP 0.035 IN  SOLO PLUS

## (undated) DEVICE — SINGLE-USE DIGITAL FLEXIBLE URETEROSCOPE: Brand: APTRA